# Patient Record
Sex: FEMALE | Race: WHITE | NOT HISPANIC OR LATINO | Employment: OTHER | URBAN - METROPOLITAN AREA
[De-identification: names, ages, dates, MRNs, and addresses within clinical notes are randomized per-mention and may not be internally consistent; named-entity substitution may affect disease eponyms.]

---

## 2019-08-27 ENCOUNTER — HOSPITAL ENCOUNTER (OUTPATIENT)
Facility: CLINIC | Age: 69
Discharge: HOME | End: 2019-08-27
Attending: COLON & RECTAL SURGERY | Admitting: COLON & RECTAL SURGERY
Payer: MEDICARE

## 2019-08-27 VITALS
HEART RATE: 74 BPM | HEIGHT: 66 IN | BODY MASS INDEX: 40.18 KG/M2 | DIASTOLIC BLOOD PRESSURE: 77 MMHG | RESPIRATION RATE: 26 BRPM | SYSTOLIC BLOOD PRESSURE: 128 MMHG | WEIGHT: 250 LBS | OXYGEN SATURATION: 95 %

## 2019-08-27 LAB — COLONOSCOPY: NORMAL

## 2019-08-27 PROCEDURE — 99153 MOD SED SAME PHYS/QHP EA: CPT | Performed by: COLON & RECTAL SURGERY

## 2019-08-27 PROCEDURE — 25000128 H RX IP 250 OP 636: Performed by: COLON & RECTAL SURGERY

## 2019-08-27 PROCEDURE — 45378 DIAGNOSTIC COLONOSCOPY: CPT | Performed by: COLON & RECTAL SURGERY

## 2019-08-27 PROCEDURE — G0105 COLORECTAL SCRN; HI RISK IND: HCPCS | Performed by: COLON & RECTAL SURGERY

## 2019-08-27 PROCEDURE — G0500 MOD SEDAT ENDO SERVICE >5YRS: HCPCS | Performed by: COLON & RECTAL SURGERY

## 2019-08-27 RX ORDER — FLUMAZENIL 0.1 MG/ML
0.2 INJECTION, SOLUTION INTRAVENOUS
Status: DISCONTINUED | OUTPATIENT
Start: 2019-08-27 | End: 2019-08-27 | Stop reason: HOSPADM

## 2019-08-27 RX ORDER — ANASTROZOLE 1 MG/1
1 TABLET ORAL EVERY MORNING
COMMUNITY
End: 2024-06-24

## 2019-08-27 RX ORDER — LOSARTAN POTASSIUM 50 MG/1
50 TABLET ORAL DAILY
COMMUNITY

## 2019-08-27 RX ORDER — ONDANSETRON 4 MG/1
4 TABLET, ORALLY DISINTEGRATING ORAL EVERY 6 HOURS PRN
Status: DISCONTINUED | OUTPATIENT
Start: 2019-08-27 | End: 2019-08-27 | Stop reason: HOSPADM

## 2019-08-27 RX ORDER — HYDROCHLOROTHIAZIDE 25 MG/1
12.5 TABLET ORAL DAILY
COMMUNITY

## 2019-08-27 RX ORDER — ONDANSETRON 2 MG/ML
4 INJECTION INTRAMUSCULAR; INTRAVENOUS
Status: DISCONTINUED | OUTPATIENT
Start: 2019-08-27 | End: 2019-08-27 | Stop reason: HOSPADM

## 2019-08-27 RX ORDER — FENTANYL CITRATE 50 UG/ML
INJECTION, SOLUTION INTRAMUSCULAR; INTRAVENOUS PRN
Status: DISCONTINUED | OUTPATIENT
Start: 2019-08-27 | End: 2019-08-27 | Stop reason: HOSPADM

## 2019-08-27 RX ORDER — ARIPIPRAZOLE 2 MG/1
2 TABLET ORAL DAILY
COMMUNITY
End: 2021-05-07

## 2019-08-27 RX ORDER — LIDOCAINE 40 MG/G
CREAM TOPICAL
Status: DISCONTINUED | OUTPATIENT
Start: 2019-08-27 | End: 2019-08-27 | Stop reason: HOSPADM

## 2019-08-27 RX ORDER — DULOXETIN HYDROCHLORIDE 30 MG/1
60 CAPSULE, DELAYED RELEASE ORAL DAILY
COMMUNITY

## 2019-08-27 RX ORDER — ONDANSETRON 2 MG/ML
4 INJECTION INTRAMUSCULAR; INTRAVENOUS EVERY 6 HOURS PRN
Status: DISCONTINUED | OUTPATIENT
Start: 2019-08-27 | End: 2019-08-27 | Stop reason: HOSPADM

## 2019-08-27 RX ORDER — NALOXONE HYDROCHLORIDE 0.4 MG/ML
.1-.4 INJECTION, SOLUTION INTRAMUSCULAR; INTRAVENOUS; SUBCUTANEOUS
Status: DISCONTINUED | OUTPATIENT
Start: 2019-08-27 | End: 2019-08-27 | Stop reason: HOSPADM

## 2019-08-27 ASSESSMENT — MIFFLIN-ST. JEOR: SCORE: 1680.74

## 2019-08-27 NOTE — BRIEF OP NOTE
[facility]    Brief Operative Note    Pre-operative diagnosis: HISTORY OF POLYPS; FAMILY HISTORY OF COLON CANCER  Post-operative diagnosis diverticulosis  Procedure: Procedure(s):  COLONOSCOPY  Surgeon: Surgeon(s) and Role:     * Paty Aguillon MD - Primary  Anesthesia: Conscious Sedation   Estimated blood loss: None  Drains: None  Specimens: * No specimens in log *  Findings:   See Provation procedure note in Epic    Complications: None.  Implants:  * No implants in log *

## 2019-08-27 NOTE — H&P
Pre-Endoscopy History and Physical     Mai Dutton MRN# [id number]   YOB: 1950 Age: 69 year old     Date of Procedure: 8/27/2019  Primary care provider: Cherry Grace  Type of Endoscopy: colonoscopy  Reason for Procedure: screening, family history   Type of Anesthesia Anticipated: moderate sedation    HPI:    Mai is a 69 year old female who will be undergoing the above procedure.  Patient reports multiple loose stools about once a week; in between she has normal formed stools. She denies  Bleeding.     A history and physical has been performed. The patient's medications and allergies have been reviewed. The risks and benefits of the procedure and the sedation options and risks were discussed with the patient.  All questions were answered and informed consent was obtained.      She denies a personal or family history of anesthesia complications or bleeding disorders.   Prior to Admission medications    Medication Sig Start Date End Date Taking? Authorizing Provider   anastrozole (ARIMIDEX) 1 MG tablet Take 1 mg by mouth daily   Yes Reported, Patient   ARIPiprazole (ABILIFY) 2 MG tablet Take 2 mg by mouth daily   Yes Reported, Patient   Cholecalciferol (VITAMIN D3) 2000 UNIT CAPS Take  by mouth.   Yes Reported, Patient   DULoxetine (CYMBALTA) 60 MG capsule Take 60 mg by mouth daily   Yes Reported, Patient   hydrochlorothiazide (HYDRODIURIL) 25 MG tablet Take 25 mg by mouth daily   Yes Reported, Patient   levothyroxine (SYNTHROID, LEVOTHROID) 112 MCG tablet Take 112 mcg by mouth daily.   Yes Reported, Patient   losartan (COZAAR) 50 MG tablet Take 50 mg by mouth daily   Yes Reported, Patient   melatonin 5 MG CAPS    Yes Reported, Patient   metFORMIN (GLUCOPHAGE) 500 MG tablet Take 1,000 mg by mouth 2 times daily (with meals)   Yes Reported, Patient   naproxen sodium (ALEVE) 220 MG tablet Take 220 mg by mouth 2 times daily (with meals).   Yes Reported, Patient   simvastatin (ZOCOR) 40 MG  tablet Take 40 mg by mouth At Bedtime.   Yes Reported, Patient   lidocaine-prilocaine (EMLA) cream Apply  topically as needed.    Reported, Patient   lorazepam (ATIVAN) 1 MG tablet Take 1 mg by mouth 4 times daily as needed.    Reported, Patient   mirtazapine (REMERON) 15 MG tablet Take 0.5 tablets by mouth At Bedtime. 11/4/11   Shekhar Schneider MD       Allergies   Allergen Reactions     Amoxicillin Nausea     Bees      Lisinopril Cough     Wasp Venom Protein      Other reaction(s): Edema        Current Facility-Administered Medications   Medication     lidocaine (LMX4) cream     lidocaine 1 % 0.1-1 mL     ondansetron (ZOFRAN) injection 4 mg     sodium chloride (PF) 0.9% PF flush 3 mL     sodium chloride (PF) 0.9% PF flush 3 mL       There is no problem list on file for this patient.       Past Medical History:   Diagnosis Date     Anxiety      Cancer (H)     right breast CA     Cancer (H)     uterine CA     Depressive disorder      Hyperlipidemia      Hypertension      Rheumatic fever     age 9     Skin cancer     melanoma     Thyroid disease         Past Surgical History:   Procedure Laterality Date     BREAST SURGERY Bilateral 2011    bilateral mastectomy for right breast CA stage 3     ENT SURGERY  age 9    tonsillectomy      EYE SURGERY Bilateral     lasik surgery     GYN SURGERY      vaginal hystectomy for uterine CA     ORTHOPEDIC SURGERY Left     left knee arthroscopy     SOFT TISSUE SURGERY      melanoma lesion, back --excision       Social History     Tobacco Use     Smoking status: Not on file   Substance Use Topics     Alcohol use: Not on file       Family History   Problem Relation Age of Onset     Coronary Artery Disease Mother      Diabetes Mother      Hypertension Mother      Hyperlipidemia Mother      Colon Cancer Father      Coronary Artery Disease Father      Hypertension Father      Hyperlipidemia Father        REVIEW OF SYSTEMS:     5 point ROS negative except as noted above in HPI, including  "Gen., Resp., CV, GI system review. She does report urinary incontinence.       PHYSICAL EXAM:   BP (!) 145/81   Pulse 78   Resp 21   Ht 1.676 m (5' 6\")   Wt 113.4 kg (250 lb)   SpO2 94%   BMI 40.35 kg/m   Estimated body mass index is 29.95 kg/m  as calculated from the following:    Height as of 11/2/11: 1.651 m (5' 5\").    Weight as of 11/2/11: 81.6 kg (180 lb).   GENERAL APPEARANCE: healthy  MENTAL STATUS: alert  AIRWAY EXAM: Mallampatti Class III (visualization of the soft palate and base of uvula)  RESP: lungs clear to auscultation - no rales, rhonchi or wheezes  CV: regular rates and rhythm      DIAGNOSTICS:    Not indicated      IMPRESSION   ASA Class 2 - Mild systemic disease        PLAN:       Colonoscopy with possible polypectomy, possible biopsy. The indications, procedure and risks were explained to the patient who agrees to proceed.       The above has been forwarded to the consulting provider.      Signed Electronically by: Paty Aguillon MD  August 27, 2019          "

## 2020-09-10 ENCOUNTER — TRANSFERRED RECORDS (OUTPATIENT)
Dept: HEALTH INFORMATION MANAGEMENT | Facility: CLINIC | Age: 70
End: 2020-09-10

## 2020-10-26 ENCOUNTER — HOSPITAL ENCOUNTER (OUTPATIENT)
Facility: CLINIC | Age: 70
End: 2020-10-26
Attending: ORTHOPAEDIC SURGERY | Admitting: ORTHOPAEDIC SURGERY
Payer: MEDICARE

## 2020-10-26 DIAGNOSIS — Z11.59 ENCOUNTER FOR SCREENING FOR OTHER VIRAL DISEASES: Primary | ICD-10-CM

## 2020-12-10 RX ORDER — ATORVASTATIN CALCIUM 40 MG/1
40 TABLET, FILM COATED ORAL AT BEDTIME
COMMUNITY

## 2020-12-10 RX ORDER — UBIDECARENONE 100 MG
100 CAPSULE ORAL DAILY
COMMUNITY
End: 2024-06-24

## 2021-04-22 DIAGNOSIS — Z11.59 ENCOUNTER FOR SCREENING FOR OTHER VIRAL DISEASES: ICD-10-CM

## 2021-05-07 RX ORDER — HYDROCODONE BITARTRATE AND ACETAMINOPHEN 5; 325 MG/1; MG/1
1 TABLET ORAL DAILY PRN
Status: ON HOLD | COMMUNITY
End: 2021-05-11

## 2021-05-07 NOTE — PROGRESS NOTES
PTA medications updated by Medication Scribe prior to surgery via phone call with patient        Comments:    Medication history sources: Patient, Surescripts and H&P  In the past week, patient estimated taking medication this percent of the time: Greater than 90%  Adherence assessment: N/A Not Observed    Significant changes made to the medication list:  None      Additional medication history information:   None    The information provided in this note is only as accurate as the sources available at the time of update(s)      Prior to Admission medications    Medication Sig Last Dose Taking? Auth Provider   anastrozole (ARIMIDEX) 1 MG tablet Take 1 mg by mouth every morning   at AM Yes Reported, Patient   atorvastatin (LIPITOR) 40 MG tablet Take 40 mg by mouth At Bedtime  at PM Yes Reported, Patient   Cholecalciferol (VITAMIN D3) 2000 UNIT CAPS Take 50 mcg by mouth daily   at AM Yes Reported, Patient   co-enzyme Q-10 100 MG CAPS capsule Take 100 mg by mouth daily  at AM Yes Reported, Patient   Cyanocobalamin (B-12) 1000 MCG TBCR Take 1,000 mcg by mouth daily  at AM Yes Reported, Patient   DULoxetine (CYMBALTA) 30 MG capsule Take 60 mg by mouth daily (2 X 30MG)  at AM Yes Reported, Patient   hydrochlorothiazide (HYDRODIURIL) 25 MG tablet Take 25 mg by mouth daily  at AM Yes Reported, Patient   HYDROcodone-acetaminophen (NORCO) 5-325 MG tablet Take 1 tablet by mouth daily as needed for severe pain  Yes Reported, Patient   levothyroxine (TIROSINT) 150 MCG capsule Take 150 mcg by mouth daily   at AM Yes Reported, Patient   losartan (COZAAR) 50 MG tablet Take 50 mg by mouth daily  at AM Yes Reported, Patient   melatonin 5 MG CAPS Take 5 mg by mouth every evening   at PM Yes Reported, Patient   metFORMIN (GLUCOPHAGE) 500 MG tablet Take 1,000 mg by mouth 2 times daily (with meals) (2 x 500mg)  Yes Reported, Patient   omeprazole (PRILOSEC) 20 MG DR capsule Take 20 mg by mouth daily as needed  at PRN Yes Reported, Patient

## 2021-05-10 ENCOUNTER — ANESTHESIA (OUTPATIENT)
Dept: SURGERY | Facility: CLINIC | Age: 71
End: 2021-05-10
Payer: MEDICARE

## 2021-05-10 ENCOUNTER — ANESTHESIA EVENT (OUTPATIENT)
Dept: SURGERY | Facility: CLINIC | Age: 71
End: 2021-05-10
Payer: MEDICARE

## 2021-05-10 ENCOUNTER — APPOINTMENT (OUTPATIENT)
Dept: GENERAL RADIOLOGY | Facility: CLINIC | Age: 71
End: 2021-05-10
Attending: SPECIALIST/TECHNOLOGIST
Payer: MEDICARE

## 2021-05-10 ENCOUNTER — APPOINTMENT (OUTPATIENT)
Dept: PHYSICAL THERAPY | Facility: CLINIC | Age: 71
End: 2021-05-10
Attending: ORTHOPAEDIC SURGERY
Payer: MEDICARE

## 2021-05-10 ENCOUNTER — HOSPITAL ENCOUNTER (OUTPATIENT)
Facility: CLINIC | Age: 71
Discharge: HOME HEALTH | End: 2021-05-11
Attending: ORTHOPAEDIC SURGERY | Admitting: ORTHOPAEDIC SURGERY
Payer: MEDICARE

## 2021-05-10 DIAGNOSIS — M17.11 OSTEOARTHRITIS OF RIGHT KNEE, UNSPECIFIED OSTEOARTHRITIS TYPE: ICD-10-CM

## 2021-05-10 DIAGNOSIS — Z96.651 STATUS POST TOTAL KNEE REPLACEMENT, RIGHT: Primary | ICD-10-CM

## 2021-05-10 LAB
CREAT SERPL-MCNC: 1.31 MG/DL (ref 0.52–1.04)
GFR SERPL CREATININE-BSD FRML MDRD: 41 ML/MIN/{1.73_M2}
GLUCOSE BLDC GLUCOMTR-MCNC: 165 MG/DL (ref 70–99)
GLUCOSE BLDC GLUCOMTR-MCNC: 251 MG/DL (ref 70–99)
LABORATORY COMMENT REPORT: NORMAL
SARS-COV-2 RNA RESP QL NAA+PROBE: NEGATIVE
SPECIMEN SOURCE: NORMAL

## 2021-05-10 PROCEDURE — 250N000009 HC RX 250: Performed by: NURSE ANESTHETIST, CERTIFIED REGISTERED

## 2021-05-10 PROCEDURE — 250N000011 HC RX IP 250 OP 636: Performed by: SPECIALIST/TECHNOLOGIST

## 2021-05-10 PROCEDURE — 250N000011 HC RX IP 250 OP 636: Performed by: ANESTHESIOLOGY

## 2021-05-10 PROCEDURE — 82565 ASSAY OF CREATININE: CPT | Performed by: ORTHOPAEDIC SURGERY

## 2021-05-10 PROCEDURE — 250N000025 HC SEVOFLURANE, PER MIN: Performed by: ORTHOPAEDIC SURGERY

## 2021-05-10 PROCEDURE — 360N000077 HC SURGERY LEVEL 4, PER MIN: Performed by: ORTHOPAEDIC SURGERY

## 2021-05-10 PROCEDURE — 258N000003 HC RX IP 258 OP 636: Performed by: NURSE ANESTHETIST, CERTIFIED REGISTERED

## 2021-05-10 PROCEDURE — 999N000141 HC STATISTIC PRE-PROCEDURE NURSING ASSESSMENT: Performed by: ORTHOPAEDIC SURGERY

## 2021-05-10 PROCEDURE — 250N000013 HC RX MED GY IP 250 OP 250 PS 637: Performed by: SPECIALIST/TECHNOLOGIST

## 2021-05-10 PROCEDURE — 258N000001 HC RX 258: Performed by: ORTHOPAEDIC SURGERY

## 2021-05-10 PROCEDURE — 82962 GLUCOSE BLOOD TEST: CPT | Mod: 91

## 2021-05-10 PROCEDURE — 278N000051 HC OR IMPLANT GENERAL: Performed by: ORTHOPAEDIC SURGERY

## 2021-05-10 PROCEDURE — 272N000001 HC OR GENERAL SUPPLY STERILE: Performed by: ORTHOPAEDIC SURGERY

## 2021-05-10 PROCEDURE — 97530 THERAPEUTIC ACTIVITIES: CPT | Mod: GP | Performed by: PHYSICAL THERAPIST

## 2021-05-10 PROCEDURE — 258N000003 HC RX IP 258 OP 636: Performed by: SPECIALIST/TECHNOLOGIST

## 2021-05-10 PROCEDURE — 250N000013 HC RX MED GY IP 250 OP 250 PS 637: Performed by: INTERNAL MEDICINE

## 2021-05-10 PROCEDURE — 258N000003 HC RX IP 258 OP 636: Performed by: ANESTHESIOLOGY

## 2021-05-10 PROCEDURE — 250N000011 HC RX IP 250 OP 636: Performed by: NURSE ANESTHETIST, CERTIFIED REGISTERED

## 2021-05-10 PROCEDURE — C1776 JOINT DEVICE (IMPLANTABLE): HCPCS | Performed by: ORTHOPAEDIC SURGERY

## 2021-05-10 PROCEDURE — 999N000063 XR KNEE PORT RIGHT 1/2 VIEWS: Mod: RT

## 2021-05-10 PROCEDURE — 96372 THER/PROPH/DIAG INJ SC/IM: CPT | Performed by: PHYSICIAN ASSISTANT

## 2021-05-10 PROCEDURE — 370N000017 HC ANESTHESIA TECHNICAL FEE, PER MIN: Performed by: ORTHOPAEDIC SURGERY

## 2021-05-10 PROCEDURE — 250N000009 HC RX 250: Performed by: ANESTHESIOLOGY

## 2021-05-10 PROCEDURE — 36415 COLL VENOUS BLD VENIPUNCTURE: CPT | Performed by: ORTHOPAEDIC SURGERY

## 2021-05-10 PROCEDURE — 250N000009 HC RX 250: Performed by: ORTHOPAEDIC SURGERY

## 2021-05-10 PROCEDURE — 97161 PT EVAL LOW COMPLEX 20 MIN: CPT | Mod: GP | Performed by: PHYSICAL THERAPIST

## 2021-05-10 PROCEDURE — 250N000012 HC RX MED GY IP 250 OP 636 PS 637: Performed by: PHYSICIAN ASSISTANT

## 2021-05-10 PROCEDURE — 710N000009 HC RECOVERY PHASE 1, LEVEL 1, PER MIN: Performed by: ORTHOPAEDIC SURGERY

## 2021-05-10 PROCEDURE — 250N000013 HC RX MED GY IP 250 OP 250 PS 637: Performed by: PHYSICIAN ASSISTANT

## 2021-05-10 PROCEDURE — 87635 SARS-COV-2 COVID-19 AMP PRB: CPT | Performed by: ORTHOPAEDIC SURGERY

## 2021-05-10 PROCEDURE — 99204 OFFICE O/P NEW MOD 45 MIN: CPT | Performed by: HOSPITALIST

## 2021-05-10 PROCEDURE — 99207 PR APP CREDIT; MD BILLING SHARED VISIT: CPT | Performed by: PHYSICIAN ASSISTANT

## 2021-05-10 PROCEDURE — 97116 GAIT TRAINING THERAPY: CPT | Mod: GP | Performed by: PHYSICAL THERAPIST

## 2021-05-10 PROCEDURE — 99207 PR CDG-CODE CATEGORY CHANGED: CPT | Performed by: HOSPITALIST

## 2021-05-10 DEVICE — CRUCIATE RETAINING FEMORAL
Type: IMPLANTABLE DEVICE | Site: KNEE | Status: FUNCTIONAL
Brand: TRIATHLON

## 2021-05-10 DEVICE — UNIVERSAL TIBIAL BASEPLATE
Type: IMPLANTABLE DEVICE | Site: KNEE | Status: FUNCTIONAL
Brand: TRIATHLON

## 2021-05-10 DEVICE — BONE CEMENT SIMPLEX FULL DOSE 6191-1-001: Type: IMPLANTABLE DEVICE | Site: KNEE | Status: FUNCTIONAL

## 2021-05-10 DEVICE — PATELLA
Type: IMPLANTABLE DEVICE | Site: KNEE | Status: FUNCTIONAL
Brand: TRIATHLON

## 2021-05-10 RX ORDER — HYDROMORPHONE HYDROCHLORIDE 1 MG/ML
.3-.5 INJECTION, SOLUTION INTRAMUSCULAR; INTRAVENOUS; SUBCUTANEOUS EVERY 5 MIN PRN
Status: DISCONTINUED | OUTPATIENT
Start: 2021-05-10 | End: 2021-05-10 | Stop reason: HOSPADM

## 2021-05-10 RX ORDER — SODIUM CHLORIDE, SODIUM LACTATE, POTASSIUM CHLORIDE, CALCIUM CHLORIDE 600; 310; 30; 20 MG/100ML; MG/100ML; MG/100ML; MG/100ML
INJECTION, SOLUTION INTRAVENOUS CONTINUOUS
Status: DISCONTINUED | OUTPATIENT
Start: 2021-05-10 | End: 2021-05-10 | Stop reason: HOSPADM

## 2021-05-10 RX ORDER — OXYCODONE HYDROCHLORIDE 5 MG/1
5 TABLET ORAL EVERY 4 HOURS PRN
Status: DISCONTINUED | OUTPATIENT
Start: 2021-05-10 | End: 2021-05-11 | Stop reason: HOSPADM

## 2021-05-10 RX ORDER — KETOROLAC TROMETHAMINE 15 MG/ML
15 INJECTION, SOLUTION INTRAMUSCULAR; INTRAVENOUS EVERY 6 HOURS
Status: DISCONTINUED | OUTPATIENT
Start: 2021-05-10 | End: 2021-05-10

## 2021-05-10 RX ORDER — DEXAMETHASONE SODIUM PHOSPHATE 4 MG/ML
10 INJECTION, SOLUTION INTRA-ARTICULAR; INTRALESIONAL; INTRAMUSCULAR; INTRAVENOUS; SOFT TISSUE ONCE
Status: COMPLETED | OUTPATIENT
Start: 2021-05-10 | End: 2021-05-10

## 2021-05-10 RX ORDER — NALOXONE HYDROCHLORIDE 0.4 MG/ML
0.2 INJECTION, SOLUTION INTRAMUSCULAR; INTRAVENOUS; SUBCUTANEOUS
Status: DISCONTINUED | OUTPATIENT
Start: 2021-05-10 | End: 2021-05-11 | Stop reason: HOSPADM

## 2021-05-10 RX ORDER — NALOXONE HYDROCHLORIDE 0.4 MG/ML
0.4 INJECTION, SOLUTION INTRAMUSCULAR; INTRAVENOUS; SUBCUTANEOUS
Status: DISCONTINUED | OUTPATIENT
Start: 2021-05-10 | End: 2021-05-11 | Stop reason: HOSPADM

## 2021-05-10 RX ORDER — ATORVASTATIN CALCIUM 40 MG/1
40 TABLET, FILM COATED ORAL AT BEDTIME
Status: DISCONTINUED | OUTPATIENT
Start: 2021-05-10 | End: 2021-05-11 | Stop reason: HOSPADM

## 2021-05-10 RX ORDER — ACETAMINOPHEN 325 MG/1
975 TABLET ORAL EVERY 8 HOURS
Status: DISCONTINUED | OUTPATIENT
Start: 2021-05-10 | End: 2021-05-11 | Stop reason: HOSPADM

## 2021-05-10 RX ORDER — DEXTROSE MONOHYDRATE 25 G/50ML
25-50 INJECTION, SOLUTION INTRAVENOUS
Status: DISCONTINUED | OUTPATIENT
Start: 2021-05-10 | End: 2021-05-11 | Stop reason: HOSPADM

## 2021-05-10 RX ORDER — DIPHENHYDRAMINE HCL 12.5MG/5ML
12.5 LIQUID (ML) ORAL EVERY 6 HOURS PRN
Status: DISCONTINUED | OUTPATIENT
Start: 2021-05-10 | End: 2021-05-11 | Stop reason: HOSPADM

## 2021-05-10 RX ORDER — BISACODYL 10 MG
10 SUPPOSITORY, RECTAL RECTAL DAILY PRN
Status: DISCONTINUED | OUTPATIENT
Start: 2021-05-10 | End: 2021-05-11 | Stop reason: HOSPADM

## 2021-05-10 RX ORDER — POLYETHYLENE GLYCOL 3350 17 G/17G
17 POWDER, FOR SOLUTION ORAL DAILY
Status: DISCONTINUED | OUTPATIENT
Start: 2021-05-11 | End: 2021-05-11 | Stop reason: HOSPADM

## 2021-05-10 RX ORDER — OXYCODONE HYDROCHLORIDE 5 MG/1
10 TABLET ORAL EVERY 4 HOURS PRN
Status: DISCONTINUED | OUTPATIENT
Start: 2021-05-10 | End: 2021-05-11 | Stop reason: HOSPADM

## 2021-05-10 RX ORDER — NALOXONE HYDROCHLORIDE 0.4 MG/ML
0.2 INJECTION, SOLUTION INTRAMUSCULAR; INTRAVENOUS; SUBCUTANEOUS
Status: DISCONTINUED | OUTPATIENT
Start: 2021-05-10 | End: 2021-05-10

## 2021-05-10 RX ORDER — DULOXETIN HYDROCHLORIDE 30 MG/1
60 CAPSULE, DELAYED RELEASE ORAL DAILY
Status: DISCONTINUED | OUTPATIENT
Start: 2021-05-11 | End: 2021-05-11 | Stop reason: HOSPADM

## 2021-05-10 RX ORDER — FENTANYL CITRATE 50 UG/ML
INJECTION, SOLUTION INTRAMUSCULAR; INTRAVENOUS PRN
Status: DISCONTINUED | OUTPATIENT
Start: 2021-05-10 | End: 2021-05-10

## 2021-05-10 RX ORDER — PROPOFOL 10 MG/ML
INJECTION, EMULSION INTRAVENOUS PRN
Status: DISCONTINUED | OUTPATIENT
Start: 2021-05-10 | End: 2021-05-10

## 2021-05-10 RX ORDER — NALOXONE HYDROCHLORIDE 0.4 MG/ML
0.4 INJECTION, SOLUTION INTRAMUSCULAR; INTRAVENOUS; SUBCUTANEOUS
Status: DISCONTINUED | OUTPATIENT
Start: 2021-05-10 | End: 2021-05-10

## 2021-05-10 RX ORDER — LEVOTHYROXINE SODIUM 13 UG/1
150 CAPSULE ORAL
Status: DISCONTINUED | OUTPATIENT
Start: 2021-05-11 | End: 2021-05-11 | Stop reason: HOSPADM

## 2021-05-10 RX ORDER — SODIUM CHLORIDE, SODIUM LACTATE, POTASSIUM CHLORIDE, CALCIUM CHLORIDE 600; 310; 30; 20 MG/100ML; MG/100ML; MG/100ML; MG/100ML
INJECTION, SOLUTION INTRAVENOUS CONTINUOUS
Status: DISCONTINUED | OUTPATIENT
Start: 2021-05-10 | End: 2021-05-11 | Stop reason: HOSPADM

## 2021-05-10 RX ORDER — TRANEXAMIC ACID 650 MG/1
1950 TABLET ORAL ONCE
Status: COMPLETED | OUTPATIENT
Start: 2021-05-10 | End: 2021-05-10

## 2021-05-10 RX ORDER — ONDANSETRON 4 MG/1
4 TABLET, ORALLY DISINTEGRATING ORAL EVERY 30 MIN PRN
Status: DISCONTINUED | OUTPATIENT
Start: 2021-05-10 | End: 2021-05-10 | Stop reason: HOSPADM

## 2021-05-10 RX ORDER — HYDROMORPHONE HYDROCHLORIDE 1 MG/ML
0.4 INJECTION, SOLUTION INTRAMUSCULAR; INTRAVENOUS; SUBCUTANEOUS
Status: DISCONTINUED | OUTPATIENT
Start: 2021-05-10 | End: 2021-05-11 | Stop reason: HOSPADM

## 2021-05-10 RX ORDER — ONDANSETRON 4 MG/1
4 TABLET, ORALLY DISINTEGRATING ORAL EVERY 6 HOURS PRN
Status: DISCONTINUED | OUTPATIENT
Start: 2021-05-10 | End: 2021-05-11 | Stop reason: HOSPADM

## 2021-05-10 RX ORDER — HYDROMORPHONE HCL IN WATER/PF 6 MG/30 ML
0.2 PATIENT CONTROLLED ANALGESIA SYRINGE INTRAVENOUS
Status: DISCONTINUED | OUTPATIENT
Start: 2021-05-10 | End: 2021-05-11 | Stop reason: HOSPADM

## 2021-05-10 RX ORDER — ASPIRIN 81 MG/1
81 TABLET ORAL 2 TIMES DAILY WITH MEALS
Status: DISCONTINUED | OUTPATIENT
Start: 2021-05-10 | End: 2021-05-11 | Stop reason: HOSPADM

## 2021-05-10 RX ORDER — CEFAZOLIN SODIUM 2 G/100ML
2 INJECTION, SOLUTION INTRAVENOUS SEE ADMIN INSTRUCTIONS
Status: DISCONTINUED | OUTPATIENT
Start: 2021-05-10 | End: 2021-05-10 | Stop reason: HOSPADM

## 2021-05-10 RX ORDER — AMOXICILLIN 250 MG
1 CAPSULE ORAL 2 TIMES DAILY
Status: DISCONTINUED | OUTPATIENT
Start: 2021-05-10 | End: 2021-05-11 | Stop reason: HOSPADM

## 2021-05-10 RX ORDER — ACETAMINOPHEN 325 MG/1
650 TABLET ORAL EVERY 4 HOURS PRN
Qty: 100 TABLET | Refills: 0 | Status: SHIPPED | OUTPATIENT
Start: 2021-05-10

## 2021-05-10 RX ORDER — PROPOFOL 10 MG/ML
INJECTION, EMULSION INTRAVENOUS CONTINUOUS PRN
Status: DISCONTINUED | OUTPATIENT
Start: 2021-05-10 | End: 2021-05-10

## 2021-05-10 RX ORDER — ACETAMINOPHEN 325 MG/1
650 TABLET ORAL EVERY 4 HOURS PRN
Status: DISCONTINUED | OUTPATIENT
Start: 2021-05-13 | End: 2021-05-11 | Stop reason: HOSPADM

## 2021-05-10 RX ORDER — PROCHLORPERAZINE MALEATE 5 MG
5 TABLET ORAL EVERY 6 HOURS PRN
Status: DISCONTINUED | OUTPATIENT
Start: 2021-05-10 | End: 2021-05-11 | Stop reason: HOSPADM

## 2021-05-10 RX ORDER — KETOROLAC TROMETHAMINE 30 MG/ML
15 INJECTION, SOLUTION INTRAMUSCULAR; INTRAVENOUS EVERY 6 HOURS PRN
Status: DISCONTINUED | OUTPATIENT
Start: 2021-05-10 | End: 2021-05-10 | Stop reason: HOSPADM

## 2021-05-10 RX ORDER — ACETAMINOPHEN 325 MG/1
975 TABLET ORAL ONCE
Status: COMPLETED | OUTPATIENT
Start: 2021-05-10 | End: 2021-05-10

## 2021-05-10 RX ORDER — ANASTROZOLE 1 MG/1
1 TABLET ORAL EVERY MORNING
Status: DISCONTINUED | OUTPATIENT
Start: 2021-05-11 | End: 2021-05-11 | Stop reason: HOSPADM

## 2021-05-10 RX ORDER — CEFAZOLIN SODIUM 2 G/100ML
2 INJECTION, SOLUTION INTRAVENOUS EVERY 8 HOURS
Status: COMPLETED | OUTPATIENT
Start: 2021-05-10 | End: 2021-05-10

## 2021-05-10 RX ORDER — LIDOCAINE HYDROCHLORIDE 20 MG/ML
INJECTION, SOLUTION INFILTRATION; PERINEURAL PRN
Status: DISCONTINUED | OUTPATIENT
Start: 2021-05-10 | End: 2021-05-10

## 2021-05-10 RX ORDER — NICOTINE POLACRILEX 4 MG
15-30 LOZENGE BUCCAL
Status: DISCONTINUED | OUTPATIENT
Start: 2021-05-10 | End: 2021-05-11 | Stop reason: HOSPADM

## 2021-05-10 RX ORDER — ONDANSETRON 2 MG/ML
4 INJECTION INTRAMUSCULAR; INTRAVENOUS EVERY 6 HOURS PRN
Status: DISCONTINUED | OUTPATIENT
Start: 2021-05-10 | End: 2021-05-11 | Stop reason: HOSPADM

## 2021-05-10 RX ORDER — FAMOTIDINE 20 MG/1
20 TABLET, FILM COATED ORAL 2 TIMES DAILY
Status: DISCONTINUED | OUTPATIENT
Start: 2021-05-10 | End: 2021-05-11 | Stop reason: HOSPADM

## 2021-05-10 RX ORDER — ONDANSETRON 2 MG/ML
4 INJECTION INTRAMUSCULAR; INTRAVENOUS EVERY 30 MIN PRN
Status: DISCONTINUED | OUTPATIENT
Start: 2021-05-10 | End: 2021-05-10 | Stop reason: HOSPADM

## 2021-05-10 RX ORDER — ASPIRIN 81 MG/1
81 TABLET ORAL 2 TIMES DAILY
Qty: 60 TABLET | Refills: 0 | Status: SHIPPED | OUTPATIENT
Start: 2021-05-10 | End: 2024-06-24

## 2021-05-10 RX ORDER — FENTANYL CITRATE 50 UG/ML
25-50 INJECTION, SOLUTION INTRAMUSCULAR; INTRAVENOUS
Status: DISCONTINUED | OUTPATIENT
Start: 2021-05-10 | End: 2021-05-10 | Stop reason: HOSPADM

## 2021-05-10 RX ORDER — ONDANSETRON 2 MG/ML
INJECTION INTRAMUSCULAR; INTRAVENOUS PRN
Status: DISCONTINUED | OUTPATIENT
Start: 2021-05-10 | End: 2021-05-10

## 2021-05-10 RX ORDER — LIDOCAINE 40 MG/G
CREAM TOPICAL
Status: DISCONTINUED | OUTPATIENT
Start: 2021-05-10 | End: 2021-05-11 | Stop reason: HOSPADM

## 2021-05-10 RX ORDER — DOCUSATE SODIUM 100 MG/1
100 CAPSULE, LIQUID FILLED ORAL 2 TIMES DAILY
Status: DISCONTINUED | OUTPATIENT
Start: 2021-05-10 | End: 2021-05-11 | Stop reason: HOSPADM

## 2021-05-10 RX ORDER — OXYCODONE HYDROCHLORIDE 5 MG/1
5-10 TABLET ORAL EVERY 4 HOURS PRN
Qty: 30 TABLET | Refills: 0 | Status: SHIPPED | OUTPATIENT
Start: 2021-05-10 | End: 2024-06-24

## 2021-05-10 RX ORDER — CEFAZOLIN SODIUM 2 G/100ML
2 INJECTION, SOLUTION INTRAVENOUS
Status: COMPLETED | OUTPATIENT
Start: 2021-05-10 | End: 2021-05-10

## 2021-05-10 RX ADMIN — PHENYLEPHRINE HYDROCHLORIDE 100 MCG: 10 INJECTION INTRAVENOUS at 08:58

## 2021-05-10 RX ADMIN — HYDROMORPHONE HYDROCHLORIDE 0.5 MG: 1 INJECTION, SOLUTION INTRAMUSCULAR; INTRAVENOUS; SUBCUTANEOUS at 08:27

## 2021-05-10 RX ADMIN — PHENYLEPHRINE HYDROCHLORIDE 150 MCG: 10 INJECTION INTRAVENOUS at 09:04

## 2021-05-10 RX ADMIN — DEXAMETHASONE SODIUM PHOSPHATE 10 MG: 4 INJECTION, SOLUTION INTRA-ARTICULAR; INTRALESIONAL; INTRAMUSCULAR; INTRAVENOUS; SOFT TISSUE at 07:56

## 2021-05-10 RX ADMIN — SODIUM CHLORIDE, POTASSIUM CHLORIDE, SODIUM LACTATE AND CALCIUM CHLORIDE: 600; 310; 30; 20 INJECTION, SOLUTION INTRAVENOUS at 06:41

## 2021-05-10 RX ADMIN — FENTANYL CITRATE 50 MCG: 50 INJECTION, SOLUTION INTRAMUSCULAR; INTRAVENOUS at 07:39

## 2021-05-10 RX ADMIN — SENNOSIDES AND DOCUSATE SODIUM 1 TABLET: 8.6; 5 TABLET ORAL at 21:48

## 2021-05-10 RX ADMIN — PHENYLEPHRINE HYDROCHLORIDE 150 MCG: 10 INJECTION INTRAVENOUS at 09:16

## 2021-05-10 RX ADMIN — PHENYLEPHRINE HYDROCHLORIDE 100 MCG: 10 INJECTION INTRAVENOUS at 07:58

## 2021-05-10 RX ADMIN — PHENYLEPHRINE HYDROCHLORIDE 150 MCG: 10 INJECTION INTRAVENOUS at 09:24

## 2021-05-10 RX ADMIN — ATORVASTATIN CALCIUM 40 MG: 40 TABLET, FILM COATED ORAL at 21:49

## 2021-05-10 RX ADMIN — MIDAZOLAM 1 MG: 1 INJECTION INTRAMUSCULAR; INTRAVENOUS at 07:47

## 2021-05-10 RX ADMIN — ONDANSETRON 4 MG: 2 INJECTION INTRAMUSCULAR; INTRAVENOUS at 09:12

## 2021-05-10 RX ADMIN — KETOROLAC TROMETHAMINE 15 MG: 30 INJECTION, SOLUTION INTRAMUSCULAR at 11:09

## 2021-05-10 RX ADMIN — ACETAMINOPHEN 975 MG: 325 TABLET, FILM COATED ORAL at 21:48

## 2021-05-10 RX ADMIN — PROPOFOL 200 MG: 10 INJECTION, EMULSION INTRAVENOUS at 07:50

## 2021-05-10 RX ADMIN — PHENYLEPHRINE HYDROCHLORIDE 100 MCG: 10 INJECTION INTRAVENOUS at 09:36

## 2021-05-10 RX ADMIN — PHENYLEPHRINE HYDROCHLORIDE 100 MCG: 10 INJECTION INTRAVENOUS at 08:04

## 2021-05-10 RX ADMIN — CEFAZOLIN SODIUM 2 G: 2 INJECTION, SOLUTION INTRAVENOUS at 16:45

## 2021-05-10 RX ADMIN — LIDOCAINE HYDROCHLORIDE 0.5 ML: 10 INJECTION, SOLUTION EPIDURAL; INFILTRATION; INTRACAUDAL; PERINEURAL at 06:32

## 2021-05-10 RX ADMIN — TRANEXAMIC ACID 1950 MG: 650 TABLET ORAL at 06:20

## 2021-05-10 RX ADMIN — ACETAMINOPHEN 975 MG: 325 TABLET, FILM COATED ORAL at 14:40

## 2021-05-10 RX ADMIN — DEXMEDETOMIDINE HYDROCHLORIDE 8 MCG: 100 INJECTION, SOLUTION INTRAVENOUS at 07:46

## 2021-05-10 RX ADMIN — FAMOTIDINE 20 MG: 20 TABLET ORAL at 21:49

## 2021-05-10 RX ADMIN — CEFAZOLIN SODIUM 2 G: 2 INJECTION, SOLUTION INTRAVENOUS at 23:26

## 2021-05-10 RX ADMIN — PHENYLEPHRINE HYDROCHLORIDE 100 MCG: 10 INJECTION INTRAVENOUS at 08:51

## 2021-05-10 RX ADMIN — OXYCODONE HYDROCHLORIDE 5 MG: 5 TABLET ORAL at 17:11

## 2021-05-10 RX ADMIN — ACETAMINOPHEN 975 MG: 325 TABLET, FILM COATED ORAL at 06:20

## 2021-05-10 RX ADMIN — PHENYLEPHRINE HYDROCHLORIDE 50 MCG: 10 INJECTION INTRAVENOUS at 09:30

## 2021-05-10 RX ADMIN — FENTANYL CITRATE 50 MCG: 0.05 INJECTION, SOLUTION INTRAMUSCULAR; INTRAVENOUS at 10:29

## 2021-05-10 RX ADMIN — MELATONIN 5 MG TABLET 5 MG: at 21:48

## 2021-05-10 RX ADMIN — PROPOFOL 20 MCG/KG/MIN: 10 INJECTION, EMULSION INTRAVENOUS at 08:02

## 2021-05-10 RX ADMIN — INSULIN ASPART 3 UNITS: 100 INJECTION, SOLUTION INTRAVENOUS; SUBCUTANEOUS at 18:19

## 2021-05-10 RX ADMIN — ASPIRIN 81 MG: 81 TABLET, DELAYED RELEASE ORAL at 17:11

## 2021-05-10 RX ADMIN — CEFAZOLIN SODIUM 2 G: 2 INJECTION, SOLUTION INTRAVENOUS at 07:53

## 2021-05-10 RX ADMIN — DOCUSATE SODIUM 100 MG: 100 CAPSULE, LIQUID FILLED ORAL at 21:48

## 2021-05-10 RX ADMIN — PHENYLEPHRINE HYDROCHLORIDE 100 MCG: 10 INJECTION INTRAVENOUS at 08:40

## 2021-05-10 RX ADMIN — SODIUM CHLORIDE, POTASSIUM CHLORIDE, SODIUM LACTATE AND CALCIUM CHLORIDE: 600; 310; 30; 20 INJECTION, SOLUTION INTRAVENOUS at 08:40

## 2021-05-10 RX ADMIN — MIDAZOLAM HYDROCHLORIDE 2 MG: 1 INJECTION, SOLUTION INTRAMUSCULAR; INTRAVENOUS at 07:06

## 2021-05-10 RX ADMIN — SODIUM CHLORIDE, POTASSIUM CHLORIDE, SODIUM LACTATE AND CALCIUM CHLORIDE: 600; 310; 30; 20 INJECTION, SOLUTION INTRAVENOUS at 12:27

## 2021-05-10 RX ADMIN — FENTANYL CITRATE 25 MCG: 50 INJECTION, SOLUTION INTRAMUSCULAR; INTRAVENOUS at 07:48

## 2021-05-10 RX ADMIN — FENTANYL CITRATE 25 MCG: 50 INJECTION, SOLUTION INTRAMUSCULAR; INTRAVENOUS at 07:43

## 2021-05-10 RX ADMIN — PHENYLEPHRINE HYDROCHLORIDE 150 MCG: 10 INJECTION INTRAVENOUS at 09:10

## 2021-05-10 RX ADMIN — PHENYLEPHRINE HYDROCHLORIDE 150 MCG: 10 INJECTION INTRAVENOUS at 08:07

## 2021-05-10 RX ADMIN — HYDROMORPHONE HYDROCHLORIDE 0.5 MG: 1 INJECTION, SOLUTION INTRAMUSCULAR; INTRAVENOUS; SUBCUTANEOUS at 08:19

## 2021-05-10 RX ADMIN — LIDOCAINE HYDROCHLORIDE 100 MG: 20 INJECTION, SOLUTION INFILTRATION; PERINEURAL at 07:50

## 2021-05-10 RX ADMIN — OXYCODONE HYDROCHLORIDE 5 MG: 5 TABLET ORAL at 12:54

## 2021-05-10 RX ADMIN — OXYCODONE HYDROCHLORIDE 5 MG: 5 TABLET ORAL at 21:48

## 2021-05-10 RX ADMIN — PHENYLEPHRINE HYDROCHLORIDE 150 MCG: 10 INJECTION INTRAVENOUS at 09:21

## 2021-05-10 RX ADMIN — FAMOTIDINE 20 MG: 20 TABLET ORAL at 12:54

## 2021-05-10 RX ADMIN — DEXMEDETOMIDINE HYDROCHLORIDE 12 MCG: 100 INJECTION, SOLUTION INTRAVENOUS at 07:42

## 2021-05-10 RX ADMIN — HYDROMORPHONE HYDROCHLORIDE 0.5 MG: 1 INJECTION, SOLUTION INTRAMUSCULAR; INTRAVENOUS; SUBCUTANEOUS at 10:37

## 2021-05-10 RX ADMIN — PHENYLEPHRINE HYDROCHLORIDE 150 MCG: 10 INJECTION INTRAVENOUS at 08:12

## 2021-05-10 ASSESSMENT — MIFFLIN-ST. JEOR: SCORE: 1682.55

## 2021-05-10 ASSESSMENT — LIFESTYLE VARIABLES: TOBACCO_USE: 0

## 2021-05-10 NOTE — CONSULTS
[facility]  Consult Note - Hospitalist Service     Date of Admission:  5/10/2021  Consult Requested by: Heidi Doshi PA-C   Reason for Consult: Post-operative medical co-management     Assessment & Plan   Mai Dutton is a 70 year old female with PMHx of hypertension, GERD, NIDDM, MARLYN, lymphedema, and breast cancer admitted on 5/10/2021 and underwent a right total knee arthroplasty for right knee osteoarthritis. Hospitalist service was consulted for medical co-management.     S/P right total knee arthroplasty: Surgery for right knee osteoarthritis. Surgery per Dr. Guillermo. General anesthesia + adductor canal block. EBL 25 ccs.   -- Defer routine post-operative cares, IVF, DVT prophylaxis and pain control to primary service   -- Perioperative Ancef   - DVT prophy with ASA 81 mg BID   - Analgesic regimen with Tylenol 975 mg TID, oxycodone 5-10 mg q4 hrs PRN, IV dilaudid 0.2-0.4 mg q2 hrs PRN   -- Encourage pulmonary toilet; incentive spirometer at bedside   -- Bowel regimen in place while on narcotics   -- PT and OT in the AM   -- CBC and BMP in AM     Hypertension  Hyperlipidemia: Continue PTA Atorvastatin. Hold hydrochlorothiazide and Losartan until repeat renal fxn in the AM.     CKD III: Baseline creatnine 1.1-1.3.   - Repeat BMP in the AM     T2DM, non-insulin dependent: A1C 6.9 on 4/20/21.   - Hold metformin   - Medium sliding scale insulin ordered   - BS per protocol   - Monitor for hypoglycemia    MARLYN: CPAP with home settings ordered    Alcohol use: Drinks a couple glasses of wine each night. No hx of withdrawal reported.   - Monitor for hx of withdrawal.     Hx of breast cancer: Follows with Dr. Cliff Contreras. Hx of bilat mastectomy in 2011 with neoadjuvant chemo for positive noted.   - Continue PTA Arimidex     Endometrial cancer: S/P BILL/BSO. Follows with Dr. Neumann.     Urge incontinence   Recent UTI: At pre-op physical, pt was noted to have asymptomatic pyuria with culture  growing E. Coli. Treated with Keflex. .     The patient's care was discussed with the Attending Physician, Dr. Ramires, Bedside Nurse and Patient.    Mally Rincon PA-C  [facility]  Contact information available via [facility] Paging/Directory  ______________________________________________________________________    Chief Complaint   S/P R TKA    History is obtained from the patient    History of Present Illness   Mai Dutton is a 70 year old female with PMHx of hypertension, GERD, NIDDM, MARLYN, lymphedema, and breast cancer admitted on 5/10/2021 and underwent a right total knee arthroplasty for right knee osteoarthritis. Hospitalist service was consulted for medical co-management.     Surgery for right knee osteoarthritis. Surgery per Dr. Guillermo. General anesthesia + adductor canal block. EBL 25 ccs. Seen post-op. Tired following surgery.  at bedside. Pain well managed. No acute concerns.     Review of Systems   The 10 point Review of Systems is negative other than noted in the HPI.    Past Medical History    I have reviewed this patient's medical history and updated it with pertinent information if needed.   Past Medical History:   Diagnosis Date     Anxiety      Arthritis      Cancer (H)     right breast CA     Cancer (H)     uterine CA     Depressive disorder      Diabetes (H)      Hyperlipidemia      Hypertension      Hypothyroidism      Obesity      Rheumatic fever     age 9     Skin cancer     melanoma     Sleep apnea     no Cpap     Spinal stenosis      Thyroid disease      Urge incontinence      Past Surgical History   I have reviewed this patient's surgical history and updated it with pertinent information if needed.  Past Surgical History:   Procedure Laterality Date     BIOPSY      breast     BREAST SURGERY Bilateral 2011    bilateral mastectomy for right breast CA stage 3     COLONOSCOPY N/A 8/27/2019    Procedure: COLONOSCOPY;  Surgeon: Paty Aguillon  MD Deana;  Location:  GI     ENT SURGERY  age 9    tonsillectomy      EYE SURGERY Bilateral     lasik surgery     GYN SURGERY      vaginal hystectomy for uterine CA     ORTHOPEDIC SURGERY Left     left knee arthroscopy     SOFT TISSUE SURGERY      melanoma lesion, back --excision       Social History   I have reviewed this patient's social history and updated it with pertinent information if needed.  Social History     Tobacco Use     Smoking status: Never Smoker     Smokeless tobacco: Never Used   Substance Use Topics     Alcohol use: Yes     Comment: 1 beer per day     Drug use: Never     Retired OR nurse. Drinks 2 alcoholic beverages daily.     Family History   I have reviewed this patient's family history and updated it with pertinent information if needed.  Family History   Problem Relation Age of Onset     Coronary Artery Disease Mother      Diabetes Mother      Hypertension Mother      Hyperlipidemia Mother      Colon Cancer Father      Coronary Artery Disease Father      Hypertension Father      Hyperlipidemia Father        Medications   Medications Prior to Admission   Medication Sig Dispense Refill Last Dose     anastrozole (ARIMIDEX) 1 MG tablet Take 1 mg by mouth every morning    5/9/2021 at AM     atorvastatin (LIPITOR) 40 MG tablet Take 40 mg by mouth At Bedtime   5/9/2021 at PM     Cholecalciferol (VITAMIN D3) 2000 UNIT CAPS Take 50 mcg by mouth daily    5/9/2021 at AM     co-enzyme Q-10 100 MG CAPS capsule Take 100 mg by mouth daily   5/9/2021 at AM     Cyanocobalamin (B-12) 1000 MCG TBCR Take 1,000 mcg by mouth daily   5/9/2021 at AM     DULoxetine (CYMBALTA) 30 MG capsule Take 60 mg by mouth daily (2 X 30MG)   5/10/2021 at 0500     hydrochlorothiazide (HYDRODIURIL) 25 MG tablet Take 25 mg by mouth daily   5/9/2021 at AM     HYDROcodone-acetaminophen (NORCO) 5-325 MG tablet Take 1 tablet by mouth daily as needed for severe pain   5/9/2021 at 1500     levothyroxine (TIROSINT) 150 MCG capsule  Take 150 mcg by mouth daily    5/10/2021 at 0500     losartan (COZAAR) 50 MG tablet Take 50 mg by mouth daily   5/9/2021 at AM     melatonin 5 MG CAPS Take 5 mg by mouth every evening    5/9/2021 at PM     metFORMIN (GLUCOPHAGE) 500 MG tablet Take 1,000 mg by mouth 2 times daily (with meals) (2 x 500mg)   5/9/2021 at pm     omeprazole (PRILOSEC) 20 MG DR capsule Take 20 mg by mouth daily as needed   Past Month at PRN       Allergies   Allergies   Allergen Reactions     Amoxicillin Nausea     Bees      Lisinopril Cough     Wasp Venom Protein      Other reaction(s): Edema       Physical Exam   Vital Signs: Temp: 97.7  F (36.5  C) Temp src: Axillary BP: 132/72 Pulse: 91   Resp: 18 SpO2: 94 % O2 Device: Nasal cannula Oxygen Delivery: 4 LPM  Weight: 255 lbs 0 oz    CONSTITUTIONAL: Pt laying in bed, dressed in hospital garb. Appears comfortable. Cooperative with interview. Accompanied by  at bedside.   HEENT: Normocephalic, atraumatic.   CARDIOVASCULAR: RRR, 2+ systolic murmur noted. No rubs or extra heart sounds appreciated. Pulses +2/4 and regular in upper and lower extremities, bilaterally.   RESPIRATORY: No increased work of breathing. CTA, bilat; no wheezes, rales, or rhonchi appreciated.  GASTROINTESTINAL:  Abdomen soft, non-distended. BS auscultated in all four quadrants. Negative for tenderness to palpation.  No masses or organomegaly noted.  MUSCULOSKELETAL: No gross deformities noted. Normal muscle tone.   HEMATOLOGIC/LYMPHATIC/IMMUNOLOGIC: Negative for lower extremity edema, bilaterally.  NEUROLOGIC: Alert and oriented to person, place, and time.  strength intact. No focal neuro deficits.   SKIN: Warm, dry, intact. No jaundice noted. Negative for suspicious lesions, rashes, bruising, open sores or abrasions.     Data   Results for orders placed or performed during the hospital encounter of 05/10/21 (from the past 24 hour(s))   Asymptomatic SARS-CoV-2 COVID-19 Virus (Coronavirus) by PCR    Specimen:  Nasopharyngeal   Result Value Ref Range    SARS-CoV-2 Virus Specimen Source Nasal     SARS-CoV-2 PCR Result NEGATIVE     SARS-CoV-2 PCR Comment (Note)    XR Knee Port Right 1/2 Views    Narrative    XR KNEE PORT RIGHT 1/2 VIEWS 5/10/2021 10:30 AM     HISTORY: Post-Op Total Knee       Impression    IMPRESSION:  Total knee arthroplasty placement with overlying skin  staples . There appear to be articular bodies posteriorly.    BAR KELLY MD   Creatinine   Result Value Ref Range    Creatinine 1.31 (H) 0.52 - 1.04 mg/dL    GFR Estimate 41 (L) >60 mL/min/[1.73_m2]    GFR Estimate If Black 48 (L) >60 mL/min/[1.73_m2]

## 2021-05-10 NOTE — PROGRESS NOTES
Paged as pt requesting resumption of her metformin. Diet has been advanced. Creatinine recheck midday 1.31 (baseline creatinine 1.1-1.3). Would recommend holding Metformin POD #0 and utilize sliding scale insulin with plan for resumption of Metformin in the AM if repeat renal fxn remains stable post-op.

## 2021-05-10 NOTE — PROCEDURES
OPERATIVE REPORT - TKA    DATE OF SERVICE:  5/10/2021    ATTENDING: LOUISE OSORIO    SURGEON:  LOUISE OSORIO    ASSISTANT:    Heidi Barbsoa PA-C    PREOPERATIVE DIAGNOSIS:  Right knee osteoarthritis    POSTOPERATIVE DIAGNOSIS:   Same     PROCEDURES PERFORMED:   Right Total Knee Arthroplasty.      ANESTHESIA:  1. General  2. Adductor canal block    ESTIMATED BLOOD LOSS:   25 mL    TRANSFUSIONS:  none    FLUIDS:   Per anesthesia    SPECIMENS:  Arthroplasty resection materials.    DRAIN:  None    COMPLICATIONS:  None    TOURNIQUET TIME:  54 minutes at 220 mmHg    INDICATIONS:   The patient is a very pleasant 70 year old female who has had persistent complaints of knee pain for some time now. The pain interferes with activities of daily living including sitting, standing, and ambulating short distances. The physical examination showed a painful and limited range of motion of the right knee.  The x-ray showed bone-on-bone arthritis of the right knee.     The patient has tried nonoperative measures to control their pain including Tylenol, oral anti-inflammatories, activity modification, low impact exercises, assistive devices, injections, none of which have provided satisfactory pain relief. The patient desires joint replacement of the knee to improve their lifestyle and reduce their pain.      Preoperatively, the risks, benefits, and possible complications of the procedure were discussed. The risks discussed included but were not limited to wear, loosening, infection, neurovascular damage, thromboembolic disease, foot drop, limb length inequality, persistent pain, stiffness and dislocation. All of the questions were satisfactorily answered and the patient wished to proceed with joint replacement surgery to improve their lifestyle and reduce their pain.     This patients BMI is 43.  This should qualify this patient for a 22 modifer for increased technical difficulty.  Positioning, exposure, component  placement and closure are all more challenging and require more time.  The case typically require a greater number of skilled assistance.      IMPLANTS:  1. Femoral component:  Errol Triathlon CR Rt size 3  2. Tibial component: Charlene Triathlon [place] size 4   3. Poly insert:  Errol Triathlon X3 CS 11 mm  4. Patella: Charlene Triathlon X3 33 x 9 mm symmetric  5. Bone cement:  Simplex    PROCEDURE  The patient was brought to the operating room after adequate induction of Anesthesia. The patient had a tourniquet placed on the thigh and the lower extremity were prepped and draped free in the usual sterile fashion using a Betadine scrub and ChloraPrep paint.    At this point a time-out procedure was carried out to identify the patient, the appropriate operative side, the implants, the code status, the fire risk, the preoperative medications and to confirm that the patient received 2 grams of ancef within one hour of the onset of the procedure.  Following completion of this, we proceeded with the case    The leg was elevated and exsanguinated, flexed to 90 degrees and the tourniquet was inflated to 220 mmHg. A midline incision was performed. This allowed creation of full thickness subcutaneous flaps.  A midvastus arthrotomy was used.  The meniscal tibial ligaments were released as feasible medially and laterally. The medial collateral ligament was subperiosteally elevated to the origin of the semimembranosus. The accessible anterior menisci were resected. The retropatellar fat pad was partially excised. The patella was dislocated into the lateral gutter and the knee was flexed to 90 degrees.     With the knee at 90 degrees flexion, the femoral intramedullary guide chari was placed and set for 5 degrees of valgus and impacted into position. The distal femoral resection was made.  The femur was sized to a 3, the 4-in-1 block was pinned in place with the appropriate amount of external rotation matching the  transepicondylar axis and perpendicular to [place]'s line.  The anterior posterior and chamfer osteotomies were completed. The residual osteophytes were removed from the periphery of the femur.     The tibia stabilized in an anterior subluxed position and neutral sagittal alignment. The intramedullary cutting apparatus was placed down. We pinned the block in place, performed the proximal tibial resection using the oscillating saw and cutting block with 3 degrees of posterior inclination.  The tibial resection was checked with the intramedullary based checking apparatus and rasp. The tibia was sized to a 4. We put the tibial component in the appropriate amount of external rotation with the center between the middle and medial thirds of the tibial tuberosity. The proximal tibia was filled with morselized bone graft to permit cement extravasation.     The posterior osteophytes were resected using the curved half inch osteotome. The patella was everted. The patellar osteotomy, free hand, using the oscillating saw. Multiple quadrants were checked multiple times until the appropriate thickness was confirmed with the caliper.  The patella sized for a 33 mm patella. The patella was drilled through the patellar template.    The trial components were placed.  The leg came out to full extension and was nicely balanced with an 11 mm CS insert.  The knee was stable to varus and valgus stress in full extension. and mid flexion.  Our patellar tracking was checked.  The patella showed no tilt or subluxation and the patella engaging both condyles at 90 degrees. Satisfied with the patellar alignment, without any need for further releases, we removed the trial components. We prepared the bony surface for cementing.    The bony surfaces of the femur, tibia and patella were prepared for cementing.  Pulse irrigation was used on the femur, tibia and patella. The surfaces were then gauze dried. The femur, tibia and patella were cemented  in place using vacuum mixed cement. Alignment, Range of Motion, Stability and Patellar tracking were appropriate.    The wound was closed in a layered fashion. The skin was brought together, everted and approximated with staples. Sterile dressings were applied. The patient was awakened and transported postanesthesia care in stable condition at the end of the case. Sponge and needle counts were correct.     Hemostasis was obtained throughout the procedure and prior to the closure of each layer using electrocautery. Pulsatile irrigation and dilute betadine irrigation were used throughout the procedure. Surgical Body Exhaust Systems and high exchange air flow were used during the procedure. The patient received 2 grams of ancef prior to tourniquet inflation and within 1 hours of the start of the procedure for antibiotic prophylaxis.    POSTOPERATIVE PLAN  1. WBAT right lower extremity.   2. Antibiotic Prophylaxis were given 2 grams of ancef. Antibiotics were given within 1 hour of surgical incision and discontinued within 24 hours.  3. Pain control with Oxycodone, Celebrex and Tylenol  4. Follow up with Dr. Guillermo in 10-14 days. [phone].   5.  DVT prophylaxis aspirin and sequential compression devices will be started within 24 hours of surgery.  6. Plan discharge when meeting therapy goals and patient is medically stable  7. Caution with NSAID usage.  8. Hold diuretic until renal function has stabilized.  9. Hold ARB until renal function has stabilized.    Ortega Guillermo MD  [facility]  [phone]  [phone]  Primary Care Physician Cherry Grace

## 2021-05-10 NOTE — ANESTHESIA POSTPROCEDURE EVALUATION
Patient: Mai Dutton    Procedure(s):  RIGHT TOTAL KNEE ARTHROPLASTY    Diagnosis:Osteoarthritis of right knee [M17.11]  Diagnosis Additional Information: No value filed.    Anesthesia Type:  General    Note:  Disposition: Inpatient   Postop Pain Control: Uneventful            Sign Out: Well controlled pain   PONV: No   Neuro/Psych: Uneventful            Sign Out: Acceptable/Baseline neuro status   Airway/Respiratory: Uneventful            Sign Out: Acceptable/Baseline resp. status   CV/Hemodynamics: Uneventful            Sign Out: Acceptable CV status   Other NRE: NONE   DID A NON-ROUTINE EVENT OCCUR? No           Last vitals:  Vitals:    05/10/21 1250 05/10/21 1350 05/10/21 1450   BP: 132/72 135/68 134/79   Pulse: 91 91 91   Resp: 18 22 20   Temp:      SpO2: 94% 94% 90%       Last vitals prior to Anesthesia Care Transfer:  CRNA VITALS  5/10/2021 0923 - 5/10/2021 1023      5/10/2021             NIBP:  139/64    Pulse:  90    NIBP Mean:  94    SpO2:  98 %    Resp Rate (observed):  17    Resp Rate (set):  10          Electronically Signed By: Trevon Oseguera MD  May 10, 2021  5:00 PM

## 2021-05-10 NOTE — PROVIDER NOTIFICATION
Text page sent to Mally YUNG: Pt is wondering if she can start her metformin tonight. She doesn't want sliding scale. Appetite is good. Glucose is 251.

## 2021-05-10 NOTE — ANESTHESIA PREPROCEDURE EVALUATION
Anesthesia Pre-Procedure Evaluation    Patient: Mai Dutton   MRN: [id number] : 1950        Preoperative Diagnosis: Osteoarthritis of right knee [M17.11]   Procedure : Procedure(s):  RIGHT TOTAL KNEE ARTHROPLASTY     Past Medical History:   Diagnosis Date     Anxiety      Arthritis      Cancer (H)     right breast CA     Cancer (H)     uterine CA     Depressive disorder      Diabetes (H)      Hyperlipidemia      Hypertension      Hypothyroidism      Obesity      Rheumatic fever     age 9     Skin cancer     melanoma     Sleep apnea     no Cpap     Spinal stenosis      Thyroid disease      Urge incontinence       Past Surgical History:   Procedure Laterality Date     BIOPSY      breast     BREAST SURGERY Bilateral 2011    bilateral mastectomy for right breast CA stage 3     COLONOSCOPY N/A 2019    Procedure: COLONOSCOPY;  Surgeon: Paty Aguillon MD;  Location:  GI     ENT SURGERY  age 9    tonsillectomy      EYE SURGERY Bilateral     lasik surgery     GYN SURGERY      vaginal hystectomy for uterine CA     ORTHOPEDIC SURGERY Left     left knee arthroscopy     SOFT TISSUE SURGERY      melanoma lesion, back --excision      Allergies   Allergen Reactions     Amoxicillin Nausea     Bees      Lisinopril Cough     Wasp Venom Protein      Other reaction(s): Edema      Social History     Tobacco Use     Smoking status: Never Smoker     Smokeless tobacco: Never Used   Substance Use Topics     Alcohol use: Yes     Comment: 1 beer per day      Wt Readings from Last 1 Encounters:   05/10/21 115.7 kg (255 lb)        Anesthesia Evaluation            ROS/MED HX  ENT/Pulmonary:     (+) sleep apnea,  (-) tobacco use and asthma   Neurologic: Comment: Spinal stenosis (lumbar)      Cardiovascular:     (+) Dyslipidemia hypertension-----    METS/Exercise Tolerance:     Hematologic:    (-) anemia   Musculoskeletal:   (+) arthritis,     GI/Hepatic:       Renal/Genitourinary:     (+) renal disease (mildly  elevated creatinine),     Endo:     (+) type II DM, thyroid problem, hypothyroidism, Obesity,     Psychiatric/Substance Use:     (+) psychiatric history anxiety and depression alcohol abuse     Infectious Disease:       Malignancy:   (+) Malignancy (hx endometrial cancer), History of Breast and Other.    Other:            Physical Exam    Airway        Mallampati: II   TM distance: > 3 FB   Neck ROM: full   Mouth opening: > 3 cm    Respiratory Devices and Support         Dental  no notable dental history         Cardiovascular          Rhythm and rate: regular and normal     Pulmonary   pulmonary exam normal                OUTSIDE LABS:  CBC:   Lab Results   Component Value Date    WBC 7.7 11/02/2011    HGB 12.3 11/02/2011    HCT 38.1 11/02/2011     11/02/2011     BMP:   Lab Results   Component Value Date     11/02/2011    POTASSIUM 4.0 11/02/2011    CHLORIDE 108 11/02/2011    CO2 27 11/02/2011    BUN 10 11/02/2011    CR 0.75 11/02/2011     (H) 11/02/2011     COAGS: No results found for: PTT, INR, FIBR  POC: No results found for: BGM, HCG, HCGS  HEPATIC:   Lab Results   Component Value Date    ALBUMIN 4.1 11/02/2011    PROTTOTAL 6.6 (L) 11/02/2011    ALT 25 11/02/2011    AST 17 11/02/2011    ALKPHOS 76 11/02/2011    BILITOTAL 0.3 11/02/2011     OTHER:   Lab Results   Component Value Date    RAFIQ 9.2 11/02/2011    LIPASE 47 11/02/2011       Anesthesia Plan    ASA Status:  3   NPO Status:  NPO Appropriate    Anesthesia Type: Spinal.              Consents    Anesthesia Plan(s) and associated risks, benefits, and realistic alternatives discussed. Questions answered and patient/representative(s) expressed understanding.     - Discussed with:  Patient         Postoperative Care    Pain management: IV analgesics, Peripheral nerve block (Single Shot).   PONV prophylaxis: Ondansetron (or other 5HT-3)     Comments:    Adductor canal nerve block            Trevon Oseguera MD

## 2021-05-10 NOTE — PLAN OF CARE
Patient vital signs are at baseline: Yes  Patient able to ambulate as they were prior to admission or with assist devices provided by therapies during their stay:  No,  Reason:  P.T. consult pending.  Patient MUST void prior to discharge:  Yes  Patient able to tolerate oral intake:  Yes  Pain has adequate pain control using Oral analgesics:  Yes    Patient arrived to unit from PACU at about 1145. A/Ox4, weaning off O2 - IS encouraged, voiding via bedpan, tolerating regular diet, PRN Oxycodone x1 with scheduled Tylenol given for pain, P.T. will see patient this afternoon, and patient is progressing toward plan of care.

## 2021-05-10 NOTE — ANESTHESIA PROCEDURE NOTES
"Adductor canal and Femoral Procedure Note  Pre-Procedure   Staff -        Anesthesiologist:  Trevon Oseguera MD       Performed By: Anesthesiologist       Location: pre-op       Pre-Anesthestic Checklist: patient identified, IV checked, site marked, risks and benefits discussed, informed consent, monitors and equipment checked, pre-op evaluation, at physician/surgeon's request and post-op pain management  Timeout:       Correct Patient: Yes        Correct Procedure: Yes        Correct Site: Yes        Correct Position: Yes        Correct Laterality: Yes        Site Marked: Yes  Procedure Documentation  Procedure: Adductor canal, Femoral       Laterality: right       Patient Position: supine       Patient Prep/Sterile Barriers: sterile gloves, mask, \"No-touch\" technique       Skin prep: Chloraprep       Local skin infiltrated with 3 mL of 1% lidocaine.        Insertion site: upper, medial thigh.       Needle Type: insulated (8 cm Pajunk)       Needle Gauge: 22.        Needle Length (millimeters): 90        - Ultrasound guided (permanent image entered into patient's record)       - Ultrasound used to identify targeted nerve, plexus, vascular marker, or fascial plane and place a needle adjacent to it in real-time       - Ultrasound was used to visualize the spread of anesthetic in close proximity to the above referenced structure       - A permanent image is entered into the patient's record.       - The nerve(s) appeared anatomically normal       - There were no apparent abnormal pathologic findings    Assessment/Narrative         The placement was negative for: blood aspirated, painful injection and site bleeding       Paresthesias: No.    Test dose of mL at.         Test dose negative, 3 minutes after injection, for signs of intravascular, subdural, or intrathecal injection.     Bolus given via needle..        Secured via.        Insertion/Infusion Method: Single Shot       Complications: none       Injection " made incrementally with aspirations every 5 mL.    Comments:  15 cc of 0.5% Bupivacaine with epinephrine (1:400K) injected on the anterior side of the femoral artery, in close proximity to the femoral nerve branches via the adductor canal approach.    Pt tolerated well.    No complications.      Ultrasound Interpretation, Peripheral Nerve Block    1. Under ultrasound guidance, the needle was inserted and placed in close proximity to the target nerve(s).  2. Ultrasound was also used to visualize the spread of the anesthetic in close proximity to the nerve(s) being blocked.  Local anesthetic was administered in incremental doses, with intermittent negative aspiration.    3. The nerve(s) appeared anatomically normal.  4. There were no apparent abnormal pathological findings.  5. A permanent ultrasound image was saved in the patient's record.    The surgeon has given a verbal order transferring care of this patient to me for the performance of a regional analgesia block for post-op pain control. It is requested of me because I am uniquely trained and qualified to perform this block and the surgeon is neither trained nor qualified to perform this procedure.    Trevon Oseguera MD   8:18 AM

## 2021-05-10 NOTE — ANESTHESIA CARE TRANSFER NOTE
Patient: Mai ANDREWS Ofacacia    Procedure(s):  RIGHT TOTAL KNEE ARTHROPLASTY    Diagnosis: Osteoarthritis of right knee [M17.11]  Diagnosis Additional Information: No value filed.    Anesthesia Type:   General     Note:    Oropharynx: oropharynx clear of all foreign objects  Level of Consciousness: drowsy  Oxygen Supplementation: face mask    Independent Airway: airway patency satisfactory and stable  Dentition: dentition unchanged  Vital Signs Stable: post-procedure vital signs reviewed and stable  Report to RN Given: handoff report given  Patient transferred to: PACU    Handoff Report: Identifed the Patient, Identified the Reponsible Provider, Reviewed the pertinent medical history, Discussed the surgical course, Reviewed Intra-OP anesthesia mangement and issues during anesthesia, Set expectations for post-procedure period and Allowed opportunity for questions and acknowledgement of understanding      Vitals: (Last set prior to Anesthesia Care Transfer)  CRNA VITALS  5/10/2021 0923 - 5/10/2021 1001      5/10/2021             NIBP:  139/64    Pulse:  90    NIBP Mean:  94    SpO2:  98 %    Resp Rate (observed):  17    Resp Rate (set):  10        Electronically Signed By: CHANG Cedillo CRNA  May 10, 2021  10:01 AM

## 2021-05-10 NOTE — ANESTHESIA PROCEDURE NOTES
Intrathecal Procedure Note  Pre-Procedure   Staff -        Anesthesiologist:  Trevon Oseguera MD       Performed By: Anesthesiologist       Location: OR       Pre-Anesthestic Checklist: patient identified, IV checked, site marked, risks and benefits discussed, informed consent, monitors and equipment checked, pre-op evaluation and at physician/surgeon's request  Timeout:       Correct Patient: Yes        Correct Procedure: Yes        Correct Site: Yes        Correct Position: Yes   Procedure Documentation  Procedure: intrathecal       Patient Position: sitting       Patient Prep/Sterile Barriers: sterile gloves, mask, patient draped       Skin prep: Betadine       Insertion Site: L3-4. (midline approach).       Spinal Needle Type: Sabrina tip       Introducer used       Introducer: 20 G       # of attempts: 2 and  # of redirects:  2    Assessment/Narrative         Paresthesias: Yes and Resolved (see comments below).       CSF fluid: clear.      Opening pressure was cmH2O while  Sitting.      Comments:  Patient sitting on edge of OR bed, lower back cleaned and prepped in sterile fashion with betadine. 1% lido used to numb area. Introducer placed, spinal needle through introducer. 2 levels attempted (L3/4, L2/3). Brief paresthesia to left hip at L3/4. Fully resolved. Osseous obstruction with multiple redirects.  Never reached dura or CSF. Spinal procedure aborted and converted to general anesthetic

## 2021-05-11 ENCOUNTER — APPOINTMENT (OUTPATIENT)
Dept: PHYSICAL THERAPY | Facility: CLINIC | Age: 71
End: 2021-05-11
Attending: ORTHOPAEDIC SURGERY
Payer: MEDICARE

## 2021-05-11 ENCOUNTER — APPOINTMENT (OUTPATIENT)
Dept: OCCUPATIONAL THERAPY | Facility: CLINIC | Age: 71
End: 2021-05-11
Attending: ORTHOPAEDIC SURGERY
Payer: MEDICARE

## 2021-05-11 VITALS
DIASTOLIC BLOOD PRESSURE: 63 MMHG | HEIGHT: 65 IN | TEMPERATURE: 98.3 F | HEART RATE: 80 BPM | OXYGEN SATURATION: 93 % | SYSTOLIC BLOOD PRESSURE: 134 MMHG | RESPIRATION RATE: 16 BRPM | WEIGHT: 255 LBS | BODY MASS INDEX: 42.49 KG/M2

## 2021-05-11 LAB
ALBUMIN SERPL-MCNC: 3 G/DL (ref 3.4–5)
ALP SERPL-CCNC: 143 U/L (ref 40–150)
ALT SERPL W P-5'-P-CCNC: 59 U/L (ref 0–50)
ANION GAP SERPL CALCULATED.3IONS-SCNC: 3 MMOL/L (ref 3–14)
AST SERPL W P-5'-P-CCNC: 42 U/L (ref 0–45)
BILIRUB SERPL-MCNC: 0.3 MG/DL (ref 0.2–1.3)
BUN SERPL-MCNC: 34 MG/DL (ref 7–30)
CALCIUM SERPL-MCNC: 8.1 MG/DL (ref 8.5–10.1)
CHLORIDE SERPL-SCNC: 109 MMOL/L (ref 94–109)
CO2 SERPL-SCNC: 26 MMOL/L (ref 20–32)
CREAT SERPL-MCNC: 1.25 MG/DL (ref 0.52–1.04)
ERYTHROCYTE [DISTWIDTH] IN BLOOD BY AUTOMATED COUNT: 13.8 % (ref 10–15)
GFR SERPL CREATININE-BSD FRML MDRD: 44 ML/MIN/{1.73_M2}
GLUCOSE BLDC GLUCOMTR-MCNC: 127 MG/DL (ref 70–99)
GLUCOSE BLDC GLUCOMTR-MCNC: 141 MG/DL (ref 70–99)
GLUCOSE BLDC GLUCOMTR-MCNC: 177 MG/DL (ref 70–99)
GLUCOSE SERPL-MCNC: 129 MG/DL (ref 70–99)
HBA1C MFR BLD: 6.6 % (ref 0–5.6)
HCT VFR BLD AUTO: 32.6 % (ref 35–47)
HGB BLD-MCNC: 10.2 G/DL (ref 11.7–15.7)
MCH RBC QN AUTO: 28.7 PG (ref 26.5–33)
MCHC RBC AUTO-ENTMCNC: 31.3 G/DL (ref 31.5–36.5)
MCV RBC AUTO: 92 FL (ref 78–100)
PLATELET # BLD AUTO: 256 10E9/L (ref 150–450)
POTASSIUM SERPL-SCNC: 4.3 MMOL/L (ref 3.4–5.3)
PROT SERPL-MCNC: 6.5 G/DL (ref 6.8–8.8)
RBC # BLD AUTO: 3.55 10E12/L (ref 3.8–5.2)
SODIUM SERPL-SCNC: 138 MMOL/L (ref 133–144)
WBC # BLD AUTO: 11.7 10E9/L (ref 4–11)

## 2021-05-11 PROCEDURE — 97535 SELF CARE MNGMENT TRAINING: CPT | Mod: GO,59 | Performed by: OCCUPATIONAL THERAPIST

## 2021-05-11 PROCEDURE — 83036 HEMOGLOBIN GLYCOSYLATED A1C: CPT | Performed by: PHYSICIAN ASSISTANT

## 2021-05-11 PROCEDURE — 250N000009 HC RX 250: Performed by: SPECIALIST/TECHNOLOGIST

## 2021-05-11 PROCEDURE — 85027 COMPLETE CBC AUTOMATED: CPT | Performed by: HOSPITALIST

## 2021-05-11 PROCEDURE — 80053 COMPREHEN METABOLIC PANEL: CPT | Performed by: HOSPITALIST

## 2021-05-11 PROCEDURE — 97110 THERAPEUTIC EXERCISES: CPT | Mod: GP,CQ | Performed by: PHYSICAL THERAPY ASSISTANT

## 2021-05-11 PROCEDURE — 99214 OFFICE O/P EST MOD 30 MIN: CPT | Performed by: HOSPITALIST

## 2021-05-11 PROCEDURE — 99207 PR CDG-CODE CATEGORY CHANGED: CPT | Performed by: HOSPITALIST

## 2021-05-11 PROCEDURE — 97116 GAIT TRAINING THERAPY: CPT | Mod: GP,CQ | Performed by: PHYSICAL THERAPY ASSISTANT

## 2021-05-11 PROCEDURE — 250N000013 HC RX MED GY IP 250 OP 250 PS 637: Performed by: PHYSICIAN ASSISTANT

## 2021-05-11 PROCEDURE — 97165 OT EVAL LOW COMPLEX 30 MIN: CPT | Mod: GO | Performed by: OCCUPATIONAL THERAPIST

## 2021-05-11 PROCEDURE — 36415 COLL VENOUS BLD VENIPUNCTURE: CPT | Performed by: HOSPITALIST

## 2021-05-11 PROCEDURE — 97530 THERAPEUTIC ACTIVITIES: CPT | Mod: GP,CQ | Performed by: PHYSICAL THERAPY ASSISTANT

## 2021-05-11 PROCEDURE — 250N000011 HC RX IP 250 OP 636: Performed by: SPECIALIST/TECHNOLOGIST

## 2021-05-11 PROCEDURE — 82962 GLUCOSE BLOOD TEST: CPT

## 2021-05-11 PROCEDURE — 250N000013 HC RX MED GY IP 250 OP 250 PS 637: Performed by: SPECIALIST/TECHNOLOGIST

## 2021-05-11 RX ORDER — POLYETHYLENE GLYCOL 3350 17 G/17G
1 POWDER, FOR SOLUTION ORAL DAILY
Qty: 7 PACKET | Refills: 0 | Status: SHIPPED | OUTPATIENT
Start: 2021-05-11 | End: 2024-06-24

## 2021-05-11 RX ORDER — AMOXICILLIN 250 MG
1-2 CAPSULE ORAL 2 TIMES DAILY
Qty: 30 TABLET | Refills: 0 | Status: SHIPPED | OUTPATIENT
Start: 2021-05-11 | End: 2024-06-24

## 2021-05-11 RX ADMIN — INSULIN ASPART 1 UNITS: 100 INJECTION, SOLUTION INTRAVENOUS; SUBCUTANEOUS at 07:00

## 2021-05-11 RX ADMIN — ACETAMINOPHEN 975 MG: 325 TABLET, FILM COATED ORAL at 06:53

## 2021-05-11 RX ADMIN — OXYCODONE HYDROCHLORIDE 5 MG: 5 TABLET ORAL at 06:59

## 2021-05-11 RX ADMIN — POLYETHYLENE GLYCOL 3350 17 G: 17 POWDER, FOR SOLUTION ORAL at 09:26

## 2021-05-11 RX ADMIN — DOCUSATE SODIUM 100 MG: 100 CAPSULE, LIQUID FILLED ORAL at 09:25

## 2021-05-11 RX ADMIN — ANASTROZOLE 1 MG: 1 TABLET, COATED ORAL at 09:25

## 2021-05-11 RX ADMIN — FAMOTIDINE 20 MG: 10 INJECTION, SOLUTION INTRAVENOUS at 09:20

## 2021-05-11 RX ADMIN — DULOXETINE HYDROCHLORIDE 60 MG: 30 CAPSULE, DELAYED RELEASE ORAL at 09:25

## 2021-05-11 RX ADMIN — OXYCODONE HYDROCHLORIDE 5 MG: 5 TABLET ORAL at 03:10

## 2021-05-11 RX ADMIN — ASPIRIN 81 MG: 81 TABLET, DELAYED RELEASE ORAL at 09:25

## 2021-05-11 RX ADMIN — HYDROMORPHONE HYDROCHLORIDE 0.4 MG: 1 INJECTION, SOLUTION INTRAMUSCULAR; INTRAVENOUS; SUBCUTANEOUS at 09:19

## 2021-05-11 RX ADMIN — ACETAMINOPHEN 975 MG: 325 TABLET, FILM COATED ORAL at 13:06

## 2021-05-11 RX ADMIN — OXYCODONE HYDROCHLORIDE 10 MG: 5 TABLET ORAL at 11:03

## 2021-05-11 RX ADMIN — SENNOSIDES AND DOCUSATE SODIUM 1 TABLET: 8.6; 5 TABLET ORAL at 09:24

## 2021-05-11 ASSESSMENT — ACTIVITIES OF DAILY LIVING (ADL): PREVIOUS_RESPONSIBILITIES: MEAL PREP

## 2021-05-11 NOTE — PROGRESS NOTES
[facility]  Hospitalist Progress Note   05/11/2021          Assessment and Plan:       Mai Dutton is a 70 year old female with PMHx of hypertension, GERD, NIDDM, MARLYN, lymphedema, and breast cancer admitted on 5/10/2021 and underwent a right total knee arthroplasty for right knee osteoarthritis. Hospitalist service was consulted for medical co-management.      S/P right total knee arthroplasty: 5/10/2021  Surgery for right knee osteoarthritis. Surgery per Dr. Guillermo.   General anesthesia + adductor canal block. EBL 25 ccs.   Defer routine post-operative cares, IVF, DVT prophylaxis and pain control to primary service   Perioperative Ancef   DVT prophy with ASA 81 mg BID  Per ortho  Defer Analgesic regimen to Ortho   Encourage pulmonary toilet; incentive spirometer  Bowel regimen in place while on narcotics   PT and OT in the AM     Medically complicated obesity with a BMI of 42.43.  Need to consider lifestyle modification with diet and exercise as able to.     Hypertension  Hyperlipidemia:   Continue PTA Atorvastatin.   Restart PTA hydrochlorothiazide and Losartan at time of discharge.  Check BMP in 1 to 2 weeks or earlier if symptomatic.       CKD III: Baseline creatnine 1.1-1.3.   Renal function is baseline.  Avoid nephrotoxic drugs.  Check BMP in 1 to 2 weeks or earlier if symptomatic.       T2DM, non-insulin dependent: A1C 6.9 on 4/20/21  PTA Metformin held during hospitalization, was on sliding scale insulin.  Restarted at time of discharge.  Monitor renal function closely while on Metformin.     MARLYN: Not on CPAP.  Consider sleep studies as outpatient.     Alcohol use: Drinks a couple glasses of wine each night. No hx of withdrawal reported.   Will need to quit or consider cutting down on alcohol use.     Hx of breast cancer:   Follows with Dr. Cliff Contreras.   Hx of bilat mastectomy in 2011 with neoadjuvant chemo for positive noted.   Continue PTA Arimidex      Endometrial cancer: S/P  BILL/BSO. Follows with Dr. Neumann.      Urge incontinence   Recent UTI:   At pre-op physical, pt was noted to have asymptomatic pyuria with culture growing E. Coli. Treated with Keflex. .        Orders Placed This Encounter      Advance Diet as Tolerated: Regular Diet Adult      Discharge Instruction - Regular Diet Adult      Diet      DVT Prophylaxis: SCD, pharmacological DVT prophylaxis per orthopedic team.  Code Status: Full Code  Disposition: Expected discharge per orthopedic team.    Discussed with patient, bedside RN,  by the bedside.  More than 60% of time spent in direct patient care, care coordination, patient/caregiver counseling, and formalizing plan of care.   Octaviano Ramires MD        Interval History:      Sitting up in bed.  Complaining of pain in her right knee.  Has not ambulated with therapy yet.  Denies any chest pain or palpitation.  Denies any headache or dizziness.  Denies any nausea vomiting.  No urinary disturbance, has had her Garcia catheter removed earlier this morning.         Physical Exam:        Physical Exam   Temp:  [97.6  F (36.4  C)-98.7  F (37.1  C)] 98.3  F (36.8  C)  Pulse:  [80-94] 80  Resp:  [16-22] 16  BP: (124-140)/(63-79) 134/63  SpO2:  [90 %-95 %] 93 %    Intake/Output Summary (Last 24 hours) at 5/11/2021 1344  Last data filed at 5/10/2021 2211  Gross per 24 hour   Intake 2272 ml   Output 100 ml   Net 2172 ml       Admission Weight: 115.7 kg (255 lb)  Current Weight: 115.7 kg (255 lb)    PHYSICAL EXAM  GENERAL: Patient is in no distress. Alert and oriented.  HEENT: Oropharynx pink, moist.   HEART: Regular rate and rhythm. S1S2. No murmurs  LUNGS: Decreased breath sounds, no wheezing no crackles.  Respirations unlabored  ABDOMEN: Soft, no abdominal tenderness, bowel sounds heard   NEURO: Moving all extremities.  EXTREMITIES: trace pedal edema.   SKIN: Warm, dry. No rash or bruising.  PSYCHIATRY Cooperative       Medications:          acetaminophen  975 mg Oral Q8H      anastrozole  1 mg Oral QAM     aspirin  81 mg Oral BID w/meals     atorvastatin  40 mg Oral At Bedtime     docusate sodium  100 mg Oral BID     DULoxetine  60 mg Oral Daily     famotidine  20 mg Oral BID    Or     famotidine  20 mg Intravenous BID     insulin aspart  1-7 Units Subcutaneous TID AC     insulin aspart  1-5 Units Subcutaneous At Bedtime     levothyroxine  150 mcg Oral QAM AC     polyethylene glycol  17 g Oral Daily     senna-docusate  1 tablet Oral BID     sodium chloride (PF)  3 mL Intracatheter Q8H     [START ON 5/13/2021] acetaminophen, sore throat lozenge, bisacodyl, glucose **OR** dextrose **OR** glucagon, diphenhydrAMINE, HYDROmorphone **OR** HYDROmorphone, lidocaine 4%, lidocaine (buffered or not buffered), magnesium hydroxide, melatonin, naloxone **OR** naloxone **OR** naloxone **OR** naloxone, ondansetron **OR** ondansetron, oxyCODONE **OR** oxyCODONE, prochlorperazine **OR** prochlorperazine, sodium chloride (PF), sodium phosphate         Data:      All new lab and imaging data was reviewed.

## 2021-05-11 NOTE — PLAN OF CARE
Alert and oriented x 4. VSS on RA. CMS intact. Pain controlled with PRN oxycodone and scheduled tylenol. Tolerating regular diet. Ax1 gb/w to BR, WBAT. Voiding adequately and continent. BS active, but no BM yet. IVSL. R knee dressing CDI with scant dried drainage. PT and OT recommending homecare. SW putting out referrals. Per MD, okay to discharge back to home today. Will complete discharge when orders placed.

## 2021-05-11 NOTE — PROGRESS NOTES
05/10/21 1653   Quick Adds   Type of Visit Initial PT Evaluation   Living Environment   People in home spouse   Current Living Arrangements house   Home Accessibility stairs to enter home;stairs within home   Transportation Anticipated car, drives self;family or friend will provide   Living Environment Comments 2 story house   Self-Care   Usual Activity Tolerance good   Current Activity Tolerance moderate   Regular Exercise No   Equipment Currently Used at Home none   Activity/Exercise/Self-Care Comment independent with ADLs, ambulates without AD but very slowly at baseline,    Disability/Function   Walking or Climbing Stairs Difficulty yes   Walking or Climbing Stairs stair climbing difficulty, requires equipment   Fall history within last six months yes   Number of times patient has fallen within last six months 1  (can't remember how she fell but couldn't get up)   Change in Functional Status Since Onset of Current Illness/Injury yes   General Information   Onset of Illness/Injury or Date of Surgery 05/10/21   Referring Physician Dr. Guillermo   Patient/Family Therapy Goals Statement (PT) to go home and have OP PT   Pertinent History of Current Problem (include personal factors and/or comorbidities that impact the POC) Pt is a 70 year old female POD#0 TKA   Existing Precautions/Restrictions fall;oxygen therapy device and L/min   Weight-Bearing Status - RLE weight-bearing as tolerated   Cognition   Orientation Status (Cognition) oriented to;person;place;situation   Pain Assessment   Patient Currently in Pain Yes, see Vital Sign flowsheet   Integumentary/Edema   Integumentary/Edema Comments right LE wrapped   Range of Motion (ROM)   ROM Comment WFL except for right LE consisten with surgery   Strength   Strength Comments WFL except for right LE consistent with surgery   Bed Mobility   Comment (Bed Mobility) Supine to sit: Juan C   Transfers   Transfer Safety Comments sit to stand: cues and modA   Gait/Stairs  (Locomotion)   Distance in Feet (Required for LE Total Joints) 75ft   Comment (Gait/Stairs) Pt ambulates with FWW with Juan C. Pt has very slow pace, with decreased step length, step to pattern and flexed posture.   Balance   Balance Comments requires AD   Sensory Examination   Sensory Perception patient reports no sensory changes   Coordination   Coordination no deficits were identified   Muscle Tone   Muscle Tone no deficits were identified   Clinical Impression   Criteria for Skilled Therapeutic Intervention yes, treatment indicated   PT Diagnosis (PT) impaired gait   Influenced by the following impairments pain in right knee and decreased ROM and strength, decreased exercise tolerance   Functional limitations due to impairments below baseline for bed mob, transfers and gait   Clinical Presentation Stable/Uncomplicated   Clinical Presentation Rationale clinical judgment   Clinical Decision Making (Complexity) low complexity   Therapy Frequency (PT) 2x/day   Predicted Duration of Therapy Intervention (days/wks) 2 days   Planned Therapy Interventions (PT) bed mobility training;gait training;home exercise program;patient/family education;ROM (range of motion);strengthening;transfer training   Anticipated Equipment Needs at Discharge (PT) walker, rolling   Risk & Benefits of therapy have been explained evaluation/treatment results reviewed;care plan/treatment goals reviewed;risks/benefits reviewed;participants voiced agreement with care plan;patient;spouse/significant other   PT Discharge Planning    PT Discharge Recommendation (DC Rec) home with assist;home with outpatient physical therapy   PT Rationale for DC Rec Pt is below baseline for all mobility and needs assist of one for bed mobility, transfers and gait and anticipate pt will be able to go home with a walker and assist from spouse for mobility and ADLs including stairs. Pt will need continued rehab at OP PT to maximize knee strength and ROM and independence    PT Brief overview of current status  Juan C bed mob, modA sit to stand, gait 75ft with walker and Juan C   Total Evaluation Time   Total Evaluation Time (Minutes) 5

## 2021-05-11 NOTE — PLAN OF CARE
Up with assist of 1 to bathroom. Voiding adequately. Tolerating regular diet. Saline locked. Weaned to 1L nasal cannula. CMS intact. Dressing CDI. Discharge likely tomorrow.

## 2021-05-11 NOTE — PLAN OF CARE
Patient vital signs are at baseline: Yes  Patient able to ambulate as they were prior to admission or with assist devices provided by therapies during their stay:  Yes  Patient MUST void prior to discharge:  Yes  Patient able to tolerate oral intake:  Yes  Pain has adequate pain control using Oral analgesics:  Yes     Pt A/O x4, CMS intact, VSS on 1 LPM. R. Knee dressing CDI. Pain managed with scheduled tylenol and PRN Oxy. A1 with walker to bathroom, tolerating oral food, saline locked/ Pt progressing towards care plan, continue to monitor.

## 2021-05-11 NOTE — PROGRESS NOTES
05/11/21 1100   Quick Adds   Type of Visit Initial Occupational Therapy Evaluation   Living Environment   People in home spouse   Current Living Arrangements house   Home Accessibility stairs to enter home;stairs within home   Transportation Anticipated car, drives self;family or friend will provide   Living Environment Comments 2 story home--full bathroom on upstairs w/ both a walk-in shower and tub/shower combo.    Self-Care   Usual Activity Tolerance good   Current Activity Tolerance moderate   Regular Exercise No   Equipment Currently Used at Home none  (pt. has purchased RTS, plans to get shower chair)   Activity/Exercise/Self-Care Comment independent with ADLs, ambulates without AD but very slowly at baseline;pt. has a reacher, sock-aide and [facility]   Disability/Function   Walking or Climbing Stairs Difficulty yes   Walking or Climbing Stairs stair climbing difficulty, requires equipment   Fall history within last six months no   Change in Functional Status Since Onset of Current Illness/Injury yes   General Information   Onset of Illness/Injury or Date of Surgery 05/10/21   Referring Physician Dr. Guillermo  (Heidi Doshi PA-C )   Patient/Family Therapy Goal Statement (OT)   (Plans ot discharge home today)   Additional Occupational Profile Info/Pertinent History of Current Problem OT:Mai Dutton is a 70 year old female with PMHx of hypertension, GERD, NIDDM, MARLYN, lymphedema, and breast cancer admitted on 5/10/2021 and underwent a right total knee arthroplasty for right knee osteoarthritis.    Performance Patterns (Routines, Roles, Habits) Pt. reports she does the cooking, spouse does the cleaning and laundry   Existing Precautions/Restrictions fall   Right Lower Extremity (Weight-bearing Status) weight-bearing as tolerated (WBAT)   General Observations and Info Pt. up in WC, spouse present, agreeable to OT, moderate pain reported   Cognitive Status Examination   Cognitive Status Comments intact    Visual Perception   Impact of Vision Impairment on Function (Vision) no vision prblems noted/reported   Sensory   Sensory Comments no numbness/tingling reported   Pain Assessment   Patient Currently in Pain Yes, see Vital Sign flowsheet  (R knee)   Integumentary/Edema   Integumentary/Edema Comments BLE edema--has h/o lymphedema   Posture   Posture forward head position   Range of Motion Comprehensive   Comment, General Range of Motion BUE WNL/WFL   Strength Comprehensive (MMT)   Comment, General Manual Muscle Testing (MMT) Assessment BUE WNL/WFL   Coordination   Upper Extremity Coordination No deficits were identified   Bed Mobility   Comment (Bed Mobility) SBA   Transfers   Transfer Comments sit-stand SBA-CGA/WW   Balance   Balance Comments impaired;overall SBA-CGA/vc's/WW for room mobility   Lower Body Dressing Assessment/Training   [place] Level (Lower Body Dressing) minimum assist (75% patient effort)   Toileting   [place] Level (Toileting) supervision;verbal cues  (SBA)   Comment (Toileting) has RTS/ support on R side at home   Instrumental Activities of Daily Living (IADL)   Previous Responsibilities meal prep   IADL Comments spouse able to assist at home   Clinical Impression   Criteria for Skilled Therapeutic Interventions Met (OT) yes   OT Diagnosis Decline in ADL performance   OT Problem List-Impairments impacting ADL problems related to;balance;mobility;range of motion (ROM);strength;pain  (decreased LE strength/ROM)   Assessment of Occupational Performance 3-5 Performance Deficits   Identified Performance Deficits dressing, toileting, bathing, grooming, fx. transfers, IADL's   Planned Therapy Interventions (OT) ADL retraining   Clinical Decision Making Complexity (OT) low complexity   Therapy Frequency (OT) 1x eval and treat   Predicted Duration of Therapy 1X eval and treat   Anticipated Equipment Needs Upon Discharge (OT) dressing equipment;reacher;shower chair;other (see comments)  (linda  bar)   Risk & Benefits of therapy have been explained evaluation/treatment results reviewed;care plan/treatment goals reviewed;risks/benefits reviewed;current/potential barriers reviewed;participants voiced agreement with care plan;participants included;patient;spouse/significant other   OT Discharge Planning    OT Rationale for DC Rec Overall, pt. moving well, has met acute OT gaols, will have spouse to assist / I/ADl's as needed. Noted pt. also to get Home PT.   OT Brief overview of current status  overall SBA-CGA w/ transfers, room mobility using WW, SBA-min. A LE dressing   Therapy Certification   Start of Care Date 05/11/21   Certification date from 05/11/21   Certification date to 05/12/21   Medical Diagnosis R TKA   Total Evaluation Time (Minutes)   Total Evaluation Time (Minutes) 10

## 2021-05-11 NOTE — CONSULTS
Care Management Discharge Note    Discharge Date: 05/11/21       Discharge Disposition:  Home w spouse    Discharge Services: HHPT     Discharge DME:  walker    Discharge Transportation: car, drives self, family or friend will provide    Private pay costs discussed: Not applicable    PAS Confirmation Code:  NA  Patient/family educated on Medicare website which has current facility and service quality ratings:      Education Provided on the Discharge Plan:    Persons Notified of Discharge Plans: patient/spouse  Patient/Family in Agreement with the Plan:  yes    Handoff Referral Completed: No    Additional Information:  Met with patient/spouse at bedside. Discharge orders are in for HHPT. She wishes to use [facility]. Referral sent to check availability and insurance coverage  [facility] able to accept patient, SOC 5/12-13

## 2021-05-11 NOTE — DISCHARGE SUMMARY
"HOSPITAL DISCHARGE SUMMARY    Patient Name: Mai Dutton  YOB: 1950  Age: 70 year old  Medical Record Number: [id number]  Primary Physician: Cherry Grace  Phone: None  Admission Date: 5/10/2021  Discharge Date: 5/11/2021    She will be discharged from [facility] to discharge destination: Home with Home Care    PRINCIPAL DISCHARGE DIAGNOSIS: right kneeDJD  Patient Active Problem List   Diagnosis     Osteoarthritis of right knee, unspecified osteoarthritis type        PROCEDURES PERFORMED DURING HOSPITALIZATION: Total knee arthroplasty right side    BRIEF HOSPITAL COURSE: This is a pleasant 70 year old female who has had persistent complaints of pain that has failed non-operative management. Preoperative x-rays revealed arthritic changes in the right knee.  The risks, benefits and possible complications of the above procedure were discussed with the patient. Patient elected to proceed to improve their lifestyle. Informed consent was obtained. For further details, please refer to the H&P in the chart.    The patient was admitted on 5/10/2021 and underwent the above-mentioned procedure. Patient tolerated this procedure well. Postoperatively, patient was seen in consultation by the internal medicine hospitalist service. Throughout the hospitalization, wound remained clean. The patient was started and advanced in a diet. CMS remained intact. Patient was seen and evaluated by physical therapy and rehab program was initiated. Patient was also seen by occupational therapy. Hemoglobin on day of discharge was stable.    COMPLICATIONS IN HOSPITAL: complications: None    PERTINENT FINDINGS/RESULTS AT DISCHARGE:   Blood pressure 134/63, pulse 80, temperature 98.3  F (36.8  C), temperature source Oral, resp. rate 16, height 1.651 m (5' 5\"), weight 115.7 kg (255 lb), SpO2 93 %.      Subjective: Patient feels good today.  Pain controlled. Discharge instructions reviewed.      PE:   The " "patient is awake and alert  Calves are soft and non-tender.   Sensation is intact.  Dorsiflexion and plantar flexion is intact.  Foot warm with nl cap refill.  The incision is incision: covered.      Latest Laboratory Results:  Chem:  Recent Labs   Lab Test 05/11/21  0701   POTASSIUM 4.3     WBC/Hgb:  Recent Labs   Lab Test 05/11/21 0701   WBC 11.7*   HGB 10.2*     INR:  No results for input(s): INR in the last 11577 hours.  No components found for: [id number]    IMPORTANT PENDING TEST RESULTS: None    CONDITION AT DISCHARGE:    discharge condition: Stabilized    DISCHARGE ORDERS    Discharge Orders        Ambulatory Referral to Home Health      Reason for your hospital stay    Right TKA     When to call - Contact Surgeon Team    You may experience symptoms that require follow-up before your scheduled appointment. Refer to the \"Stoplight Tool\" for instructions on when to contact your Surgeon Team if you are concerned about pain control, blood clots, constipation, or if you are unable to urinate.     When to call - Reach out to Urgent Care    If you are not able to reach your Surgeon Team and you need immediate care, go to the Orthopedic Walk-in Clinic or Urgent Care at your Surgeon's office.  Do NOT go to the Emergency Room unless you have shortness of breath, chest pain, or other signs of a medical emergency.     When to call - Reasons to Call 911    Call 911 immediately if you experience sudden-onset chest pain, arm weakness/numbness, slurred speech, or shortness of breath     Discharge Instruction - Breathing exercises    Perform breathing exercises using your Incentive Spirometer 10 times per hour while awake for 2 weeks.     Symptoms - Fever Management    A low grade fever can be expected after surgery.  Use acetaminophen (TYLENOL) as needed for fever management.  Contact your Surgeon Team if you have a fever greater than 101.5 F, chills, and/or night sweats.     Symptoms - Constipation management    " Constipation (hard, dry bowel movements) is expected after surgery due to the combination of being less active, the anesthetic, and the opioid pain medication.  You can do the following to help reduce constipation:  ~  FLUIDS:  Drink clear liquids (water or Gatorade), or juice (apple/prune).  ~  DIET:  Eat a fiber rich diet.    ~  ACTIVITY:  Get up and move around several times a day.  Increase your activity as you are able.  MEDICATIONS:  Reduce the risk of constipation by starting medications before you are constipated.  You can take Miralax   (1 packet as directed) and/or a stool softener (Senokot 1-2 tablets 1-2 times a day).  If you already have constipation and these medications are not working, you can get magnesium citrate and use as directed.  If you continue to have constipation you can try an over the counter suppository or enema.  Call your Surgeon Team if it has been greater than 3 days since your last bowel movement.     Symptoms - Reduced Urine Output    Changes in the amount of fluids you drank before and after surgery may result in problems urinating.  It is important to stay well-hydrated after surgery and drink plenty of water. If it has been greater than 8 hours since you have urinated despite drinking plenty of water, call your Surgeon Team.     Activity - Exercises to prevent blood clots    Unless otherwise directed by your Surgeon team, perform the following exercises at least three times per day for the first four weeks after surgery to prevent blood clots in your legs: 1) Point and flex your feet (Ankle Pumps), 2) Move your ankle around in big circles, 3) Wiggle your toes, 4) Walk, even for short distances, several times a day, will help decrease the risk of blood clots.     Comfort and Pain Management - Pain after Surgery    Pain after surgery is normal and expected.  You will have some amount of pain for several weeks after surgery.  Your pain will improve with time.  There are several  things you can do to help reduce your pain including: rest, ice, elevation, and using pain medications as needed. Contact your Surgeon Team if you have pain that persists or worsens after surgery despite rest, ice, elevation, and taking your medication(s) as prescribed. Contact your Surgeon Team if you have new numbness, tingling, or weakness in your operative extremity.     Comfort and Pain Management - Swelling after Surgery    Swelling and/or bruising of the surgical extremity is common and may persist for several months after surgery. In addition to frequent icing and elevation, gentle compressive support with an ACE wrap or tubigrip may help with swelling. Apply compression regularly, removing at least twice daily to perform skin checks. Contact your Surgeon Team if your swelling increases and is NOT associated with an increase in your activity level, or if your swelling increases and is associated with redness and pain.     Comfort and Pain Management - Cold therapy    Ice can be used to control swelling and discomfort after surgery. Place a thin towel over your operative site and apply the ice pack overtop. Leave ice pack in place for 20 minutes, then remove for 20 minutes. Repeat this 20 minutes on/20 minutes off routine as often as tolerated.     Medication Instructions - Acetaminophen (TYLENOL) Instructions    You were discharged with acetaminophen (TYLENOL) for pain management after surgery. Acetaminophen most effectively manages pain symptoms when it is taken on a schedule without missing doses (every four, six, or eight hours). Your Provider will prescribe a safe daily dose between 3000 - 4000 mg.  Do NOT exceed this daily dose. Most patients use acetaminophen for pain control for the first four weeks after surgery.  You can wean from this medication as your pain decreases.     Medication Instructions - NSAID Instructions    You were discharged with an anti-inflammatory medication for pain management to  "use in combination with acetaminophen (TYLENOL) and the narcotic pain medication.  Take this medication exactly as directed.  You should only take one anti-inflammatory at a time.  Some common anti-inflammatories include: ibuprofen (ADVIL, MOTRIN), naproxen (ALEVE, NAPROSYN), celecoxib (CELEBREX), meloxicam (MOBIC), ketorolac (TORADOL).  Take this medication with food and water.     Medication Instructions - Opioids - Tapering Instructions    In the first three days following surgery, your symptoms may warrant use of the narcotic pain medication every four to six hours as prescribed. This is normal. As your pain symptoms improve, focus your efforts on decreasing (tapering) use of narcotic medications. The most successful tapering strategy is to first, decrease the number of tablets you take every 4-6 hours to the minimum prescribed. Then, increase the amount of time between doses.  For example:  First, taper to   or 1 tablet every 4-6 hours.  Then, taper to   or 1 tablet every 6-8 hours.  Then, taper to   or 1 tablet every 8-10 hours.  Then, taper to   or 1 tablet every 10-12 hours.  Then, taper to   or 1 tablet at bedtime.  The bedtime dose can help with comfort during sleep and is typically the last dose to be discontinued after surgery.     Follow Up Care    Follow-up with your Surgeon Team in 10-14 days for wound check.     Medication instructions -  Anticoagulation - aspirin    Take the aspirin as prescribed for a total of 6 weeks after surgery.  This is given to help minimize your risk of blood clot.     Comfort and Pain Management - LOWER Extremity Elevation    Swelling is expected for several months after surgery. This type of swelling is usually associated with gravity and activity, and can be improved with elevation.   The best way to do this is to get your \"toes above your nose\" by laying down and placing several pillows lengthwise under your calf and heel. When elevating your leg keep your knee " completely straight. Perform this elevation as often as possible especially for the first two weeks after surgery.     Medication Instructions - Opioid Instructions (Less than 65 years)    You were discharged with an opioid medication (hydromorphone, oxycodone, hydrocodone, or tramadol). This medication should only be taken for breakthrough pain that is not controlled with acetaminophen (TYLENOL). If you rate your pain less than 3 you do not need this medication.  Pain rating 0-3:  You do not need this medication.  Pain rating 4-6:  Take 1 tablet every 4-6 hours as needed  Pain rating 7-10:  Take 2 tablets every 4-6 hours as needed.  Do not exceed 6 tablets per day     Activity - Total Knee Arthroplasty     Return to Driving    Return to driving - Driving is NOT permitted until directed by your provider. Under no circumstance are you permitted to drive while using narcotic pain medications.     Dressing / Wound Care - Wound    You have a clean dressing on your surgical wound. Dressing change instructions as follows: dressing will be removed at your follow-up appointment. Contact your Surgeon Team if you have increased redness, warmth around the surgical wound, and/or drainage from the surgical wound.     Dressing / Wound Care - NO Tub Bathing    Tub bathing, swimming, or any other activities that will cause your incision to be submerged in water should be avoided until allowed by your Surgeon.     NO Precautions    No precautions directed by your Provider.  You may perform range of motion activities as tolerated.     Weight bearing as tolerated    Weight bearing as tolerated on your operative extremity.     Dressing Wound Care - Shower with wound/dressing NOT covered    You do not need to cover your dressing or incision in the shower, you may allow water and soap to run over top of the surgical dressing or incision. You may shower 2 days after surgery.  You are strictly prohibited from soaking or submerging the  surgical wound underwater.     Crutches DME    DME Documentation: Describe the reason for need to support medical necessity: Impaired gait status post knee surgery. I, the undersigned, certify that the above prescribed supplies are medically necessary for this patient and is both reasonable and necessary in reference to accepted standards of medical practice in the treatment of this patient's condition and is not prescribed as a convenience.     Cane DME    DME Documentation: Describe the reason for need to support medical necessity: Impaired gait status post knee surgery. I, the undersigned, certify that the above prescribed supplies are medically necessary for this patient and is both reasonable and necessary in reference to accepted standards of medical practice in the treatment of this patient's condition and is not prescribed as a convenience.     Walker DME    DME Documentation: Describe the reason for need to support medical necessity: Impaired gait status post knee surgery. I, the undersigned, certify that the above prescribed supplies are medically necessary for this patient and is both reasonable and necessary in reference to accepted standards of medical practice in the treatment of this patient's condition and is not prescribed as a convenience.     Discharge Instruction - Regular Diet Adult    Return to your pre-surgery diet unless instructed otherwise       Discharge Medications     Current Discharge Medication List      START taking these medications    Details   acetaminophen (TYLENOL) 325 MG tablet Take 2 tablets (650 mg) by mouth every 4 hours as needed for other (mild pain)  Qty: 100 tablet, Refills: 0    Associated Diagnoses: Status post total knee replacement, right      aspirin 81 MG EC tablet Take 1 tablet (81 mg) by mouth 2 times daily  Qty: 60 tablet, Refills: 0    Associated Diagnoses: Status post total knee replacement, right      oxyCODONE (ROXICODONE) 5 MG tablet Take 1-2 tablets (5-10  mg) by mouth every 4 hours as needed for moderate to severe pain (Moderate to Severe)  Qty: 30 tablet, Refills: 0    Associated Diagnoses: Status post total knee replacement, right      polyethylene glycol (MIRALAX) 17 g packet Take 17 g by mouth daily  Qty: 7 packet, Refills: 0    Associated Diagnoses: Status post total knee replacement, right      senna-docusate (SENOKOT-S/PERICOLACE) 8.6-50 MG tablet Take 1-2 tablets by mouth 2 times daily Take while on oral narcotics to prevent or treat constipation.  Qty: 30 tablet, Refills: 0    Comments: While taking narcotics  Associated Diagnoses: Status post total knee replacement, right         CONTINUE these medications which have NOT CHANGED    Details   anastrozole (ARIMIDEX) 1 MG tablet Take 1 mg by mouth every morning       atorvastatin (LIPITOR) 40 MG tablet Take 40 mg by mouth At Bedtime      Cholecalciferol (VITAMIN D3) 2000 UNIT CAPS Take 50 mcg by mouth daily       co-enzyme Q-10 100 MG CAPS capsule Take 100 mg by mouth daily      Cyanocobalamin (B-12) 1000 MCG TBCR Take 1,000 mcg by mouth daily      DULoxetine (CYMBALTA) 30 MG capsule Take 60 mg by mouth daily (2 X 30MG)      hydrochlorothiazide (HYDRODIURIL) 25 MG tablet Take 25 mg by mouth daily      levothyroxine (TIROSINT) 150 MCG capsule Take 150 mcg by mouth daily       losartan (COZAAR) 50 MG tablet Take 50 mg by mouth daily      melatonin 5 MG CAPS Take 5 mg by mouth every evening       metFORMIN (GLUCOPHAGE) 500 MG tablet Take 1,000 mg by mouth 2 times daily (with meals) (2 x 500mg)      omeprazole (PRILOSEC) 20 MG DR capsule Take 20 mg by mouth daily as needed         STOP taking these medications       HYDROcodone-acetaminophen (NORCO) 5-325 MG tablet Comments:   Reason for Stopping:                 After Discharge Recommendations:  1. Resume pre-admission diet  2. DVT prophylaxis: anticoagulants: aspirin  3. Follow up: She should see Dr. Guillermo in clinic in 2 wks.  4. Sutures or staples will be  removed by orthopedic surgeon at the 10-14 day follow-up appointment.  5. Weight Bearing weightbearing: WBAT (Weight bearing as tolerated).  6. Additional followup: None  7. Special instructions: None    Total time spent for discharge on date of discharge: 25 minutes    Physician(s) in addition to primary physician who should receive a copy:  Primary Care Physician Cherry Grace  Orthopedic Medicine and Surgery -[phone]    Zulema Burrows PA-C ....................  5/11/2021   10:08 AM

## 2021-05-11 NOTE — PLAN OF CARE
Occupational Therapy Discharge Summary    Reason for therapy discharge:    Discharged to home.    Progress towards therapy goal(s). See goals on Care Plan in [facility] electronic health record for goal details.  Goals met    Therapy recommendation(s):    No further therapy is recommended.No further skilled OT intervention recommended;noted pt to have Home PT.

## 2022-11-08 ENCOUNTER — APPOINTMENT (OUTPATIENT)
Dept: URBAN - METROPOLITAN AREA CLINIC 256 | Age: 72
Setting detail: DERMATOLOGY
End: 2022-11-08

## 2022-11-08 VITALS — HEIGHT: 66 IN | WEIGHT: 250 LBS

## 2022-11-08 DIAGNOSIS — D22 MELANOCYTIC NEVI: ICD-10-CM

## 2022-11-08 DIAGNOSIS — D18.0 HEMANGIOMA: ICD-10-CM

## 2022-11-08 DIAGNOSIS — L57.0 ACTINIC KERATOSIS: ICD-10-CM

## 2022-11-08 DIAGNOSIS — L57.8 OTHER SKIN CHANGES DUE TO CHRONIC EXPOSURE TO NONIONIZING RADIATION: ICD-10-CM

## 2022-11-08 DIAGNOSIS — L82.1 OTHER SEBORRHEIC KERATOSIS: ICD-10-CM

## 2022-11-08 DIAGNOSIS — Z71.89 OTHER SPECIFIED COUNSELING: ICD-10-CM

## 2022-11-08 PROBLEM — D18.01 HEMANGIOMA OF SKIN AND SUBCUTANEOUS TISSUE: Status: ACTIVE | Noted: 2022-11-08

## 2022-11-08 PROBLEM — D22.5 MELANOCYTIC NEVI OF TRUNK: Status: ACTIVE | Noted: 2022-11-08

## 2022-11-08 PROCEDURE — OTHER MIPS QUALITY: OTHER

## 2022-11-08 PROCEDURE — 17000 DESTRUCT PREMALG LESION: CPT

## 2022-11-08 PROCEDURE — 99213 OFFICE O/P EST LOW 20 MIN: CPT | Mod: 25

## 2022-11-08 PROCEDURE — OTHER COUNSELING: OTHER

## 2022-11-08 PROCEDURE — OTHER LIQUID NITROGEN: OTHER

## 2022-11-08 PROCEDURE — 17003 DESTRUCT PREMALG LES 2-14: CPT

## 2022-11-08 ASSESSMENT — LOCATION DETAILED DESCRIPTION DERM
LOCATION DETAILED: RIGHT SUPERIOR LATERAL BUCCAL CHEEK
LOCATION DETAILED: LEFT MEDIAL MALAR CHEEK
LOCATION DETAILED: LEFT INFERIOR UPPER BACK
LOCATION DETAILED: LEFT MEDIAL UPPER BACK
LOCATION DETAILED: RIGHT INFERIOR LATERAL FOREHEAD
LOCATION DETAILED: LEFT INFERIOR MEDIAL FOREHEAD
LOCATION DETAILED: LEFT LATERAL BUCCAL CHEEK
LOCATION DETAILED: LEFT SUPERIOR MEDIAL MIDBACK
LOCATION DETAILED: RIGHT MEDIAL MALAR CHEEK
LOCATION DETAILED: RIGHT SUPERIOR MEDIAL BUCCAL CHEEK
LOCATION DETAILED: RIGHT INFERIOR MEDIAL FOREHEAD
LOCATION DETAILED: LEFT SUPERIOR LATERAL LOWER BACK
LOCATION DETAILED: INFERIOR MID FOREHEAD
LOCATION DETAILED: LEFT SUPERIOR UPPER BACK

## 2022-11-08 ASSESSMENT — LOCATION ZONE DERM
LOCATION ZONE: TRUNK
LOCATION ZONE: FACE

## 2022-11-08 ASSESSMENT — LOCATION SIMPLE DESCRIPTION DERM
LOCATION SIMPLE: LEFT FOREHEAD
LOCATION SIMPLE: LEFT UPPER BACK
LOCATION SIMPLE: INFERIOR FOREHEAD
LOCATION SIMPLE: LEFT LOWER BACK
LOCATION SIMPLE: RIGHT FOREHEAD
LOCATION SIMPLE: RIGHT CHEEK
LOCATION SIMPLE: LEFT CHEEK

## 2022-11-08 NOTE — PROCEDURE: LIQUID NITROGEN
Post-Care Instructions: I reviewed with the patient in detail post-care instructions. Patient is to wear sunprotection, and avoid picking at any of the treated lesions. Pt may apply Vaseline to crusted or scabbing areas.
Render Post-Care Instructions In Note?: no
Show Aperture Variable?: Yes
Detail Level: Simple
Duration Of Freeze Thaw-Cycle (Seconds): 0
Consent: The patient's consent was obtained including but not limited to risks of crusting, scabbing, blistering, scarring, darker or lighter pigmentary change, recurrence, incomplete removal and infection.

## 2023-12-08 ENCOUNTER — TELEPHONE (OUTPATIENT)
Dept: NEPHROLOGY | Facility: CLINIC | Age: 73
End: 2023-12-08
Payer: MEDICARE

## 2023-12-08 DIAGNOSIS — N18.30 CKD (CHRONIC KIDNEY DISEASE) STAGE 3, GFR 30-59 ML/MIN (H): Primary | ICD-10-CM

## 2023-12-08 NOTE — TELEPHONE ENCOUNTER
[facility] Call Center    Phone Message    May a detailed message be left on voicemail: no     Reason for Call: Order(s): Other:   Reason for requested: Lab orders  Date needed: 06/24/24  Provider name: Dr Mathew    Action Taken: Other: [facility]    Travel Screening: Not Applicable

## 2024-06-18 ENCOUNTER — TELEPHONE (OUTPATIENT)
Dept: NEPHROLOGY | Facility: CLINIC | Age: 74
End: 2024-06-18
Payer: MEDICARE

## 2024-06-18 NOTE — TELEPHONE ENCOUNTER
Attempted to contact pt.  No answer.  Message left on cell VM to return call.      Calling to discuss: Question if pt is keeping scheduled In clinic appt with Dr. Perez 6/24/24 at 8:30am.  Chart reviewed.  Pt was seen by [facility] at [facility] 1/17/24 Alesia Guajardo NP.    Shelby Gaines LPN

## 2024-06-18 NOTE — TELEPHONE ENCOUNTER
"2nd attempt to contact pt.  Not available-  \"Hayden\" answered.  Message left for pt to return call.    Shelby Gaines LPN    "

## 2024-06-18 NOTE — TELEPHONE ENCOUNTER
Pt returned call.  Stated she does want to keep this appt with Dr. Perez.  Confirmed appt date and time.  Gave clinic location with address and building phone number [phone].    Pt had no further questions.    Shelby Gaines LPN

## 2024-06-24 ENCOUNTER — OFFICE VISIT (OUTPATIENT)
Dept: NEPHROLOGY | Facility: CLINIC | Age: 74
End: 2024-06-24
Payer: MEDICARE

## 2024-06-24 ENCOUNTER — LAB (OUTPATIENT)
Dept: LAB | Facility: CLINIC | Age: 74
End: 2024-06-24
Payer: MEDICARE

## 2024-06-24 VITALS
BODY MASS INDEX: 42.37 KG/M2 | SYSTOLIC BLOOD PRESSURE: 148 MMHG | OXYGEN SATURATION: 95 % | HEART RATE: 80 BPM | DIASTOLIC BLOOD PRESSURE: 77 MMHG | WEIGHT: 254.6 LBS

## 2024-06-24 DIAGNOSIS — N18.32 STAGE 3B CHRONIC KIDNEY DISEASE (H): ICD-10-CM

## 2024-06-24 DIAGNOSIS — R32 URINARY INCONTINENCE, UNSPECIFIED TYPE: Primary | ICD-10-CM

## 2024-06-24 DIAGNOSIS — N18.30 CKD (CHRONIC KIDNEY DISEASE) STAGE 3, GFR 30-59 ML/MIN (H): ICD-10-CM

## 2024-06-24 LAB
ALBUMIN MFR UR ELPH: 10 MG/DL
ALBUMIN SERPL BCG-MCNC: 4.8 G/DL (ref 3.5–5.2)
ALBUMIN UR-MCNC: NEGATIVE MG/DL
AMORPH CRY #/AREA URNS HPF: ABNORMAL /HPF
ANION GAP SERPL CALCULATED.3IONS-SCNC: 12 MMOL/L (ref 7–15)
APPEARANCE UR: ABNORMAL
BACTERIA #/AREA URNS HPF: ABNORMAL /HPF
BILIRUB UR QL STRIP: NEGATIVE
BUN SERPL-MCNC: 30.9 MG/DL (ref 8–23)
CALCIUM SERPL-MCNC: 10.1 MG/DL (ref 8.8–10.2)
CHLORIDE SERPL-SCNC: 102 MMOL/L (ref 98–107)
CK SERPL-CCNC: 48 U/L (ref 26–192)
COLOR UR AUTO: YELLOW
CREAT SERPL-MCNC: 1.68 MG/DL (ref 0.51–0.95)
CREAT UR-MCNC: 107 MG/DL
DEPRECATED HCO3 PLAS-SCNC: 25 MMOL/L (ref 22–29)
EGFRCR SERPLBLD CKD-EPI 2021: 32 ML/MIN/1.73M2
ERYTHROCYTE [DISTWIDTH] IN BLOOD BY AUTOMATED COUNT: 14.2 % (ref 10–15)
GLUCOSE SERPL-MCNC: 137 MG/DL (ref 70–99)
GLUCOSE UR STRIP-MCNC: NEGATIVE MG/DL
HCT VFR BLD AUTO: 44.6 % (ref 35–47)
HGB BLD-MCNC: 14.1 G/DL (ref 11.7–15.7)
HGB UR QL STRIP: ABNORMAL
KETONES UR STRIP-MCNC: NEGATIVE MG/DL
LEUKOCYTE ESTERASE UR QL STRIP: NEGATIVE
MCH RBC QN AUTO: 28.8 PG (ref 26.5–33)
MCHC RBC AUTO-ENTMCNC: 31.6 G/DL (ref 31.5–36.5)
MCV RBC AUTO: 91 FL (ref 78–100)
NITRATE UR QL: NEGATIVE
PH UR STRIP: 5.5 [PH] (ref 5–7)
PHOSPHATE SERPL-MCNC: 4.1 MG/DL (ref 2.5–4.5)
PLATELET # BLD AUTO: 298 10E3/UL (ref 150–450)
POTASSIUM SERPL-SCNC: 4.5 MMOL/L (ref 3.4–5.3)
PROT/CREAT 24H UR: 0.09 MG/MG CR (ref 0–0.2)
PTH-INTACT SERPL-MCNC: 65 PG/ML (ref 15–65)
RBC # BLD AUTO: 4.89 10E6/UL (ref 3.8–5.2)
RBC #/AREA URNS AUTO: ABNORMAL /HPF
SKIP: ABNORMAL
SODIUM SERPL-SCNC: 139 MMOL/L (ref 135–145)
SP GR UR STRIP: 1.01 (ref 1–1.03)
SQUAMOUS #/AREA URNS AUTO: ABNORMAL /LPF
UROBILINOGEN UR STRIP-MCNC: NORMAL MG/DL
VIT D+METAB SERPL-MCNC: 43 NG/ML (ref 20–50)
WBC # BLD AUTO: 9.4 10E3/UL (ref 4–11)
WBC #/AREA URNS AUTO: ABNORMAL /HPF

## 2024-06-24 PROCEDURE — 99204 OFFICE O/P NEW MOD 45 MIN: CPT | Performed by: INTERNAL MEDICINE

## 2024-06-24 PROCEDURE — 82550 ASSAY OF CK (CPK): CPT

## 2024-06-24 PROCEDURE — 81001 URINALYSIS AUTO W/SCOPE: CPT

## 2024-06-24 PROCEDURE — 82306 VITAMIN D 25 HYDROXY: CPT

## 2024-06-24 PROCEDURE — 36415 COLL VENOUS BLD VENIPUNCTURE: CPT

## 2024-06-24 PROCEDURE — 85027 COMPLETE CBC AUTOMATED: CPT

## 2024-06-24 PROCEDURE — 83970 ASSAY OF PARATHORMONE: CPT

## 2024-06-24 PROCEDURE — 80069 RENAL FUNCTION PANEL: CPT

## 2024-06-24 PROCEDURE — 84156 ASSAY OF PROTEIN URINE: CPT

## 2024-06-24 RX ORDER — TIRZEPATIDE 10 MG/.5ML
10 INJECTION, SOLUTION SUBCUTANEOUS
COMMUNITY
Start: 2024-05-15 | End: 2024-06-24

## 2024-06-24 RX ORDER — PREGABALIN 50 MG/1
CAPSULE ORAL
COMMUNITY
Start: 2024-03-19

## 2024-06-24 RX ORDER — SEMAGLUTIDE 2.68 MG/ML
INJECTION, SOLUTION SUBCUTANEOUS
COMMUNITY
Start: 2024-05-20

## 2024-06-24 RX ORDER — TIZANIDINE 2 MG/1
TABLET ORAL
COMMUNITY
Start: 2023-06-26

## 2024-06-24 NOTE — PATIENT INSTRUCTIONS
Goal sodium intake of 2000 mg per day  Recheck BP before leaving today  Monitor BP at home - goal is 110-130 ideally. Do this for a week and write them down. Mychart these readings in a week.   If difficulty with heart burn, recommend taking famotidine as needed (pepcid).   Urology referral - Dr. Quintanilla specifically.   Tentative plan to follow-up in 6 months.

## 2024-06-24 NOTE — PROGRESS NOTES
06/24/24    Assessment/Plan:   CKD Stage 3: in the setting of longstanding hypertension/diabetes/previous KRISTYN. Also at risk for kidney disease in the setting of obesity and hx of nephrolithiasis. Recommend good diabetes and blood pressure control, avoidance of NSAIDs, weight optimization, and good hydration. Calcium is at the upper limit of normal - want to avoid hypercalcemia  Hypertension: asked her to monitor sodium intake and minimize as able. Asked her to monitor pressures at home and update if above goal.   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 Encourage her to trial famotidine as needed for GERD.   MARLYN: not on CPAP as could not tolerate  Obesity: BMI 42 - encourage weight loss as she is at risk for obesity related glomerulopathy/FSGS changes. ON ozempic.     Patient Instructions   Goal sodium intake of 2000 mg per day  Recheck BP before leaving today  Monitor BP at home - goal is 110-130 ideally. Do this for a week and write them down. Mychart these readings in a week.   If difficulty with heart burn, recommend taking famotidine as needed (pepcid).   Urology referral - Dr. Quintanilla specifically.   Tentative plan to follow-up in 6 months.        Monie Perez, DO    6/24/24  This patient was self referred for evaluation of CKD.     CC: CKD    HPI: Mai Dutton is a 74 year old female who presents for evaluation of CKD. Ms. Dutton was previously seen by nephrology in [facility] but presents today to establish in our system. ON review of her creatinine levels, they have been 0.8-1 from [id number], 1.1-1.3 from [id number]. She had an KRISTYN in April 2023 - creatinine peaked at 2.41 during a left obstructing stone and UTI - ureteral stent placed. Creatinine since this KRISTYN has been 1.6-1.9. Ultrasound in [facility] 2023 was normal other than a simple cyst noted on the left; possible  nonobstructing stone on left. She was seen by urology in Dec 2023 and no intervention was warranted. IN regards to risk factors for kidney disease, she has diabetes - dx in her 60s - she denies any difficulty with retinopathy. Addt hx includes hypertension - dx in her 60s at least. She is on ozempic but denies any success with weight loss. No NSAID use. She was previously living on aleve per her report - stopped 1-2 years ago. She denies any gross hematuria but reports dark urine at times. She has lymphedema. Uses tums PRN - last dose last PM - uses typically a couple of times per week. She has difficulty with orthopedic issues/back pain - difficulty with spinal stenosis. She has plans to have left knee replaced - rpeviously with right knee replacement. No family hx of kidney disease. No hx of pregnancy/preeclampsia. She is a retired RN.     Current Outpatient Medications   Medication Sig Dispense Refill    acetaminophen (TYLENOL) 325 MG tablet Take 2 tablets (650 mg) by mouth every 4 hours as needed for other (mild pain) 100 tablet 0    anastrozole (ARIMIDEX) 1 MG tablet Take 1 mg by mouth every morning       aspirin 81 MG EC tablet Take 1 tablet (81 mg) by mouth 2 times daily 60 tablet 0    atorvastatin (LIPITOR) 40 MG tablet Take 40 mg by mouth At Bedtime      Cholecalciferol (VITAMIN D3) 2000 UNIT CAPS Take 50 mcg by mouth daily       co-enzyme Q-10 100 MG CAPS capsule Take 100 mg by mouth daily      Cyanocobalamin (B-12) 1000 MCG TBCR Take 1,000 mcg by mouth daily      DULoxetine (CYMBALTA) 30 MG capsule Take 60 mg by mouth daily (2 X 30MG)      hydrochlorothiazide (HYDRODIURIL) 25 MG tablet Take 25 mg by mouth daily      levothyroxine (TIROSINT) 150 MCG capsule Take 150 mcg by mouth daily       losartan (COZAAR) 50 MG tablet Take 50 mg by mouth daily      melatonin 5 MG CAPS Take 5 mg by mouth every evening       metFORMIN (GLUCOPHAGE) 500 MG tablet Take 1,000 mg by mouth 2 times daily (with meals) (2 x 500mg)       omeprazole (PRILOSEC) 20 MG DR capsule Take 20 mg by mouth daily as needed      oxyCODONE (ROXICODONE) 5 MG tablet Take 1-2 tablets (5-10 mg) by mouth every 4 hours as needed for moderate to severe pain (Moderate to Severe) 30 tablet 0    polyethylene glycol (MIRALAX) 17 g packet Take 17 g by mouth daily 7 packet 0    senna-docusate (SENOKOT-S/PERICOLACE) 8.6-50 MG tablet Take 1-2 tablets by mouth 2 times daily Take while on oral narcotics to prevent or treat constipation. 30 tablet 0     No current facility-administered medications for this visit.       Exam:  There were no vitals taken for this visit.  GENERAL APPEARANCE: alert and no distress  CV - RRR  R - CTAB  Ext - no pitting edema; lymphedema present    Results:    Lab on 06/24/2024   Component Date Value Ref Range Status    WBC Count 06/24/2024 9.4  4.0 - 11.0 10e3/uL Final    RBC Count 06/24/2024 4.89  3.80 - 5.20 10e6/uL Final    Hemoglobin 06/24/2024 14.1  11.7 - 15.7 g/dL Final    Hematocrit 06/24/2024 44.6  35.0 - 47.0 % Final    MCV 06/24/2024 91  78 - 100 fL Final    MCH 06/24/2024 28.8  26.5 - 33.0 pg Final    MCHC 06/24/2024 31.6  31.5 - 36.5 g/dL Final    RDW 06/24/2024 14.2  10.0 - 15.0 % Final    Platelet Count 06/24/2024 298  150 - 450 10e3/uL Final    Parathyroid Hormone Intact 06/24/2024 65  15 - 65 pg/mL Final    Total Protein Urine mg/dL 06/24/2024 10.0    mg/dL Final    The reference ranges have not been established in urine protein. The results should be integrated into the clinical context for interpretation.    Total Protein Urine mg/mg Creat 06/24/2024 0.09  0.00 - 0.20 mg/mg Cr Final    Creatinine Urine mg/dL 06/24/2024 107.0  mg/dL Final    The reference ranges have not been established in urine creatinine. The results should be integrated into the clinical context for interpretation.    Sodium 06/24/2024 139  135 - 145 mmol/L Final    Reference intervals for this test were updated on 09/26/2023 to more accurately reflect our  healthy population. There may be differences in the flagging of prior results with similar values performed with this method. Interpretation of those prior results can be made in the context of the updated reference intervals.     Potassium 06/24/2024 4.5  3.4 - 5.3 mmol/L Final    Chloride 06/24/2024 102  98 - 107 mmol/L Final    Carbon Dioxide (CO2) 06/24/2024 25  22 - 29 mmol/L Final    Anion Gap 06/24/2024 12  7 - 15 mmol/L Final    Glucose 06/24/2024 137 (H)  70 - 99 mg/dL Final    Urea Nitrogen 06/24/2024 30.9 (H)  8.0 - 23.0 mg/dL Final    Creatinine 06/24/2024 1.68 (H)  0.51 - 0.95 mg/dL Final    GFR Estimate 06/24/2024 32 (L)  >60 mL/min/1.73m2 Final    eGFR calculated using 2021 CKD-EPI equation.    Calcium 06/24/2024 10.1  8.8 - 10.2 mg/dL Final    Albumin 06/24/2024 4.8  3.5 - 5.2 g/dL Final    Phosphorus 06/24/2024 4.1  2.5 - 4.5 mg/dL Final    Color Urine 06/24/2024 Yellow  Colorless, Straw, Light Yellow, Yellow Final    Appearance Urine 06/24/2024 Slightly Cloudy (A)  Clear Final    Glucose Urine 06/24/2024 Negative  Negative mg/dL Final    Bilirubin Urine 06/24/2024 Negative  Negative Final    Ketones Urine 06/24/2024 Negative  Negative mg/dL Final    Specific Gravity Urine 06/24/2024 1.015  0.999 - 1.035 Final    Blood Urine 06/24/2024 Trace (A)  Negative Final    pH Urine 06/24/2024 5.5  5.0 - 7.0 Final    Protein Albumin Urine 06/24/2024 Negative  Negative mg/dL Final    Urobilinogen Urine 06/24/2024 Normal  Normal, 2.0 mg/dL Final    Nitrite Urine 06/24/2024 Negative  Negative Final    Leukocyte Esterase Urine 06/24/2024 Negative  Negative Final    Vitamin D, Total (25-Hydroxy) 06/24/2024 43  20 - 50 ng/mL Final    optimum levels    CK 06/24/2024 48  26 - 192 U/L Final    Bacteria Urine 06/24/2024 Moderate (A)  None Seen /HPF Final    RBC Urine 06/24/2024 25-50 (A)  0-2 /HPF /HPF Final    WBC Urine 06/24/2024 0-5  0-5 /HPF /HPF Final    Squamous Epithelials Urine 06/24/2024 Many (A)  None Seen  /LPF Final    Amorphous Crystals Urine 06/24/2024 Many (A)  None Seen /HPF Final      Lab results were reviewed and interpreted.         Monie Perez DO

## 2024-06-24 NOTE — NURSING NOTE
Mai Dutton's goals for this visit include:   Chief Complaint   Patient presents with    Consult     Referred by Dr. Grace        She requests these members of her care team be copied on today's visit information: yes     PCP: Cherry Grace    Referring Provider:  Cherry Grace MD  [address]  [address]  [place] [id number]    BP (!) 142/72 (BP Location: Left arm, Patient Position: Chair, Cuff Size: Adult Large)   Pulse 82   Wt 115.5 kg (254 lb 9.6 oz)   SpO2 95%   BMI 42.37 kg/m      Do you need any medication refills at today's visit? No       NEREIDA Renteria   Neph/Pulm Teams  [facility]

## 2024-06-27 DIAGNOSIS — R31.29 MICROSCOPIC HEMATURIA: Primary | ICD-10-CM

## 2024-07-05 ENCOUNTER — TELEPHONE (OUTPATIENT)
Dept: NEPHROLOGY | Facility: CLINIC | Age: 74
End: 2024-07-05
Payer: COMMERCIAL

## 2024-07-05 NOTE — TELEPHONE ENCOUNTER
Attempted to contact pt.  No answer.  Message left on cell VM to log on to her [employer] to read message from Dr. Perez with recommendations.  Encouraged to call as well.    Shelby Gaines LPN      [employer] notification pt has not read Dr. Perez's Result Note with recommendations:    Attached are your labs we discussed at your visit. Your urine showed some red blood cells seen - no white blood cells present. Creatinine was similar to your baseline since April 2023. Vitamin d level is at goal. Given your calcium level is at the upper limit of normal, I recommend that we adjust your vitamin D supplement given you are at goal. You can either hold the vitamin D supplement and we will monitor the level going forward or if you want to continue on a maintenance dose, recommend taking only 1000 units daily. No protein in the urine which is reassuring.     Summary of recommendations:  1. Decrease vitamin D - can either hold altogether for now or if you want to take maintenance dose, decrease to just 1000 units daily  2. Because of the blood in the urine, I recommend repeat Urine testing again in the coming weeks. If blood is again noted in the urine, we will need to get urology's input in regards to the source of the blood.     Please call or [employer] with questions or concerns.     Sincerely,  Monie Perez DO   Written by Monie Perez DO on 6/27/2024 11:19 PM CD    Component      Latest Ref Rng 6/24/2024  8:06 AM   Color Urine      Colorless, Straw, Light Yellow, Yellow  Yellow    Appearance Urine      Clear  Slightly Cloudy !    Glucose Urine      Negative mg/dL Negative    Bilirubin Urine      Negative  Negative    Ketones Urine      Negative mg/dL Negative    Specific Gravity Urine      0.999 - 1.035  1.015    Blood Urine      Negative  Trace !    pH Urine      5.0 - 7.0  5.5    Protein Albumin Urine      Negative mg/dL Negative    Urobilinogen mg/dL      Normal, 2.0 mg/dL Normal    Nitrite Urine       Negative  Negative    Leukocyte Esterase Urine      Negative  Negative    Sodium      135 - 145 mmol/L 139    Potassium      3.4 - 5.3 mmol/L 4.5    Chloride      98 - 107 mmol/L 102    Carbon Dioxide (CO2)      22 - 29 mmol/L 25    Anion Gap      7 - 15 mmol/L 12    Glucose      70 - 99 mg/dL 137 (H)    Urea Nitrogen      8.0 - 23.0 mg/dL 30.9 (H)    Creatinine      0.51 - 0.95 mg/dL 1.68 (H)    GFR Estimate      >60 mL/min/1.73m2 32 (L)    Calcium      8.8 - 10.2 mg/dL 10.1    Albumin      3.5 - 5.2 g/dL 4.8    Phosphorus      2.5 - 4.5 mg/dL 4.1    WBC      4.0 - 11.0 10e3/uL 9.4    RBC Count      3.80 - 5.20 10e6/uL 4.89    Hemoglobin      11.7 - 15.7 g/dL 14.1    Hematocrit      35.0 - 47.0 % 44.6    MCV      78 - 100 fL 91    MCH      26.5 - 33.0 pg 28.8    MCHC      31.5 - 36.5 g/dL 31.6    RDW      10.0 - 15.0 % 14.2    Platelet Count      150 - 450 10e3/uL 298    Bacteria Urine      None Seen /HPF Moderate !    RBC Urine      0-2 /HPF /HPF 25-50 !    WBC Urine      0-5 /HPF /HPF 0-5    Squamous Epithelial /LPF Urine      None Seen /LPF Many !    Amorphous Crystals      None Seen /HPF Many !    Total Protein Urine mg/dL        mg/dL 10.0    Total Protein Urine mg/mg Creat      0.00 - 0.20 mg/mg Cr 0.09    Creatinine Urine      mg/dL 107.0    Parathyroid Hormone Intact      15 - 65 pg/mL 65    Vitamin D, Total (25-Hydroxy)      20 - 50 ng/mL 43    CK Total      26 - 192 U/L 48       Legend:  ! Abnormal  (H) High  (L) Low

## 2024-07-05 NOTE — LETTER
July 16, 2024      Mai Dutton  [address] [address]        Dear Mai,    We have been trying to reach you by phone with no success.  Dr. Perez has sent to you a [employer] message with recommendation on 6/27/24, please see copy.    Attached are your labs we discussed at your visit. Your urine showed some red blood cells seen - no white blood cells present. Creatinine was similar to your baseline since April 2023. Vitamin d level is at goal. Given your calcium level is at the upper limit of normal, I recommend that we adjust your vitamin D supplement given you are at goal. You can either hold the vitamin D supplement and we will monitor the level going forward or if you want to continue on a maintenance dose, recommend taking only 1000 units daily. No protein in the urine which is reassuring.     Summary of recommendations:  1. Decrease vitamin D - can either hold altogether for now or if you want to take maintenance dose, decrease to just 1000 units daily  2. Because of the blood in the urine, I recommend repeat Urine testing again in the coming weeks. If blood is again noted in the urine, we will need to get urology's input in regards to the source of the blood.     Please call or [employer] with questions or concerns.     Sincerely,  Monie Perez DO   Written by Monie Perez,  on 6/27/2024 11:19 PM CD                  Please call or send a [employer] message if you have further questions or concerns.    Best regards,    ALEXUS Ryan  Nephrology [facility]  [facility]/[place], [place]                  Component      Latest Ref Rng 6/24/2024  8:06 AM   Color Urine      Colorless, Straw, Light Yellow, Yellow  Yellow    Appearance Urine      Clear  Slightly Cloudy !    Glucose Urine      Negative mg/dL Negative    Bilirubin Urine      Negative  Negative    Ketones Urine      Negative mg/dL Negative    Specific Gravity Urine      0.999 - 1.035  1.015    Blood Urine       Negative  Trace !    pH Urine      5.0 - 7.0  5.5    Protein Albumin Urine      Negative mg/dL Negative    Urobilinogen mg/dL      Normal, 2.0 mg/dL Normal    Nitrite Urine      Negative  Negative    Leukocyte Esterase Urine      Negative  Negative    Sodium      135 - 145 mmol/L 139    Potassium      3.4 - 5.3 mmol/L 4.5    Chloride      98 - 107 mmol/L 102    Carbon Dioxide (CO2)      22 - 29 mmol/L 25    Anion Gap      7 - 15 mmol/L 12    Glucose      70 - 99 mg/dL 137 (H)    Urea Nitrogen      8.0 - 23.0 mg/dL 30.9 (H)    Creatinine      0.51 - 0.95 mg/dL 1.68 (H)    GFR Estimate      >60 mL/min/1.73m2 32 (L)    Calcium      8.8 - 10.2 mg/dL 10.1    Albumin      3.5 - 5.2 g/dL 4.8    Phosphorus      2.5 - 4.5 mg/dL 4.1    WBC      4.0 - 11.0 10e3/uL 9.4    RBC Count      3.80 - 5.20 10e6/uL 4.89    Hemoglobin      11.7 - 15.7 g/dL 14.1    Hematocrit      35.0 - 47.0 % 44.6    MCV      78 - 100 fL 91    MCH      26.5 - 33.0 pg 28.8    MCHC      31.5 - 36.5 g/dL 31.6    RDW      10.0 - 15.0 % 14.2    Platelet Count      150 - 450 10e3/uL 298    Bacteria Urine      None Seen /HPF Moderate !    RBC Urine      0-2 /HPF /HPF 25-50 !    WBC Urine      0-5 /HPF /HPF 0-5    Squamous Epithelial /LPF Urine      None Seen /LPF Many !    Amorphous Crystals      None Seen /HPF Many !    Total Protein Urine mg/dL        mg/dL 10.0    Total Protein Urine mg/mg Creat      0.00 - 0.20 mg/mg Cr 0.09    Creatinine Urine      mg/dL 107.0    Parathyroid Hormone Intact      15 - 65 pg/mL 65    Vitamin D, Total (25-Hydroxy)      20 - 50 ng/mL 43    CK Total      26 - 192 U/L 48       Legend:  ! Abnormal  (H) High  (L) Low

## 2024-07-10 NOTE — TELEPHONE ENCOUNTER
2nd attempt to contact pt.  No answer. Message left on cell VM regarding Dr. Perez's Result note message sent to her in "[employer].  Encouraged to call back as well.    Shelby Gaines LPN

## 2024-07-15 NOTE — TELEPHONE ENCOUNTER
3rd attempt to contact pt.  No answer on home number.  VM did not give identifier, no message left.  Called Mobile number.  No answer.  Message left to view message from Dr. Perez in [employer], and to return call.    Shelby Gaines LPN

## 2024-07-16 ENCOUNTER — MYC MEDICAL ADVICE (OUTPATIENT)
Dept: NEPHROLOGY | Facility: CLINIC | Age: 74
End: 2024-07-16
Payer: COMMERCIAL

## 2024-07-17 ENCOUNTER — PATIENT OUTREACH (OUTPATIENT)
Dept: NEPHROLOGY | Facility: CLINIC | Age: 74
End: 2024-07-17
Payer: COMMERCIAL

## 2024-07-17 DIAGNOSIS — N18.32 STAGE 3B CHRONIC KIDNEY DISEASE (H): Primary | ICD-10-CM

## 2024-07-17 NOTE — TELEPHONE ENCOUNTER
[employer] message also sent 7/17/24 by Nephrology Care Coordinator.  Shelby Gaines LPN

## 2024-07-17 NOTE — PROGRESS NOTES
Patient enrolled in CKD Journey. GFR 32. She has interest in receiving more CKD education at this time. Referred for Kidney [facility].   Neph Tracking Flowsheet Last Filled Values       Kidney Smart CKD Basics Referral 07/17/24    Patient's Referral Dates Auto Populate Patient's Referral Dates    Journey Referral 7/17/24

## 2024-07-22 ENCOUNTER — LAB (OUTPATIENT)
Dept: LAB | Facility: CLINIC | Age: 74
End: 2024-07-22
Payer: MEDICARE

## 2024-07-22 DIAGNOSIS — R31.29 MICROSCOPIC HEMATURIA: ICD-10-CM

## 2024-07-22 LAB
ALBUMIN UR-MCNC: NEGATIVE MG/DL
APPEARANCE UR: CLEAR
BILIRUB UR QL STRIP: NEGATIVE
COLOR UR AUTO: NORMAL
GLUCOSE UR STRIP-MCNC: NEGATIVE MG/DL
HGB UR QL STRIP: NEGATIVE
KETONES UR STRIP-MCNC: NEGATIVE MG/DL
LEUKOCYTE ESTERASE UR QL STRIP: NEGATIVE
NITRATE UR QL: NEGATIVE
PH UR STRIP: 5.5 [PH] (ref 5–7)
SKIP: NORMAL
SP GR UR STRIP: 1.02 (ref 1–1.03)
UROBILINOGEN UR STRIP-MCNC: NORMAL MG/DL

## 2024-07-22 PROCEDURE — 81003 URINALYSIS AUTO W/O SCOPE: CPT

## 2024-08-10 ENCOUNTER — HEALTH MAINTENANCE LETTER (OUTPATIENT)
Age: 74
End: 2024-08-10

## 2024-08-26 ENCOUNTER — OFFICE VISIT (OUTPATIENT)
Dept: UROLOGY | Facility: CLINIC | Age: 74
End: 2024-08-26
Attending: INTERNAL MEDICINE
Payer: MEDICARE

## 2024-08-26 DIAGNOSIS — N39.41 URGE INCONTINENCE: ICD-10-CM

## 2024-08-26 DIAGNOSIS — R31.29 MICROHEMATURIA: Primary | ICD-10-CM

## 2024-08-26 DIAGNOSIS — R32 URINARY INCONTINENCE, UNSPECIFIED TYPE: ICD-10-CM

## 2024-08-26 DIAGNOSIS — R39.15 URGENCY OF URINATION: ICD-10-CM

## 2024-08-26 PROCEDURE — 99204 OFFICE O/P NEW MOD 45 MIN: CPT | Performed by: UROLOGY

## 2024-08-26 NOTE — NURSING NOTE
Mai Dutton's goals for this visit include:   Chief Complaint   Patient presents with    New Patient     Urinary incontinence, second opinion with Dr. Quintanilla       She requests these members of her care team be copied on today's visit information:       PCP: Cherry Grace    Referring Provider:  Monie Perez DO  [address]  [place] [address]    There were no vitals taken for this visit.    Do you need any medication refills at today's visit?     Vanessa Farrar LPN on 8/26/2024 at 10:18 AM

## 2024-08-26 NOTE — PROGRESS NOTES
HPI:  Mai Dutton is a 74 year old female with microhematuria.  No gross hematuria.  She also has significant urinary urge and unawareness incontinence for which she has tried medication and botox which have not helped.    Reviewed previous notes from Dr. Cherry Grace on 7/2/24    Exam:  There were no vitals taken for this visit.  GENERAL: alert and no distress  EYES: Eyes grossly normal to inspection.  No discharge or erythema, or obvious scleral/conjunctival abnormalities.  RESP: No audible wheeze, cough, or visible cyanosis.    SKIN: Visible skin clear. No significant rash, abnormal pigmentation or lesions.  NEURO: Cranial nerves grossly intact.  Mentation and speech appropriate for age.  PSYCH: Appropriate affect, tone, and pace of words    Review of Imaging:  The following imaging exams were reviewed by me and discussed with patient:  CT abd/pelvis on 4/19/23:  Findings:    Normal caliber thoracic aorta heart size is normal. Basilar atelectasis.   Unenhanced liver gallbladder spleen unremarkable. Small hiatal hernia adrenal glands unremarkable pancreas unremarkable. Urinary bladder unremarkable diverticulosis bowel is unremarkable.   No abdominal aortic aneurysm.   Right kidney unremarkable moderate left hydronephrosis hydroureter with perinephric stranding. Low-attenuation lesion left kidney incompletely assessed there is nonobstructing small stone. There is a 3 millimeters stone left mid to distal ureter.       Review of Labs:  The following labs were reviewed by me and discussed with the patient:  Lab Results   Component Value Date    WBC 9.4 06/24/2024    WBC 11.7 05/11/2021     Lab Results   Component Value Date    RBC 4.89 06/24/2024    RBC 3.55 05/11/2021     Lab Results   Component Value Date    HGB 14.1 06/24/2024    HGB 10.2 05/11/2021     Lab Results   Component Value Date    HCT 44.6 06/24/2024    HCT 32.6 05/11/2021     Lab Results   Component Value Date    MCV 91 06/24/2024    MCV 92  05/11/2021     Lab Results   Component Value Date    MCH 28.8 06/24/2024    MCH 28.7 05/11/2021     Lab Results   Component Value Date    MCHC 31.6 06/24/2024    MCHC 31.3 05/11/2021     Lab Results   Component Value Date    RDW 14.2 06/24/2024    RDW 13.8 05/11/2021     Lab Results   Component Value Date     06/24/2024     05/11/2021        Last Comprehensive Metabolic Panel:  Sodium   Date Value Ref Range Status   06/24/2024 139 135 - 145 mmol/L Final     Comment:     Reference intervals for this test were updated on 09/26/2023 to more accurately reflect our healthy population. There may be differences in the flagging of prior results with similar values performed with this method. Interpretation of those prior results can be made in the context of the updated reference intervals.    05/11/2021 138 133 - 144 mmol/L Final     Potassium   Date Value Ref Range Status   06/24/2024 4.5 3.4 - 5.3 mmol/L Final   05/11/2021 4.3 3.4 - 5.3 mmol/L Final     Chloride   Date Value Ref Range Status   06/24/2024 102 98 - 107 mmol/L Final   05/11/2021 109 94 - 109 mmol/L Final     Carbon Dioxide   Date Value Ref Range Status   05/11/2021 26 20 - 32 mmol/L Final     Carbon Dioxide (CO2)   Date Value Ref Range Status   06/24/2024 25 22 - 29 mmol/L Final     Anion Gap   Date Value Ref Range Status   06/24/2024 12 7 - 15 mmol/L Final   05/11/2021 3 3 - 14 mmol/L Final     Glucose   Date Value Ref Range Status   06/24/2024 137 (H) 70 - 99 mg/dL Final   05/11/2021 129 (H) 70 - 99 mg/dL Final     Urea Nitrogen   Date Value Ref Range Status   06/24/2024 30.9 (H) 8.0 - 23.0 mg/dL Final   05/11/2021 34 (H) 7 - 30 mg/dL Final     Creatinine   Date Value Ref Range Status   06/24/2024 1.68 (H) 0.51 - 0.95 mg/dL Final   05/11/2021 1.25 (H) 0.52 - 1.04 mg/dL Final     GFR Estimate   Date Value Ref Range Status   06/24/2024 32 (L) >60 mL/min/1.73m2 Final     Comment:     eGFR calculated using 2021 CKD-EPI equation.   05/11/2021 44  (L) >60 mL/min/[1.73_m2] Final     Comment:     Non  GFR Calc  Starting 12/18/2018, serum creatinine based estimated GFR (eGFR) will be   calculated using the Chronic Kidney Disease Epidemiology Collaboration   (CKD-EPI) equation.       Calcium   Date Value Ref Range Status   06/24/2024 10.1 8.8 - 10.2 mg/dL Final   05/11/2021 8.1 (L) 8.5 - 10.1 mg/dL Final     Bilirubin Total   Date Value Ref Range Status   05/11/2021 0.3 0.2 - 1.3 mg/dL Final     Alkaline Phosphatase   Date Value Ref Range Status   05/11/2021 143 40 - 150 U/L Final     ALT   Date Value Ref Range Status   05/11/2021 59 (H) 0 - 50 U/L Final     AST   Date Value Ref Range Status   05/11/2021 42 0 - 45 U/L Final                Color Urine (no units)   Date Value   07/22/2024 Light Yellow     Appearance Urine (no units)   Date Value   07/22/2024 Clear     Glucose Urine (mg/dL)   Date Value   07/22/2024 Negative     Bilirubin Urine (no units)   Date Value   07/22/2024 Negative     Ketones Urine (mg/dL)   Date Value   07/22/2024 Negative     Specific Gravity Urine (no units)   Date Value   07/22/2024 1.019     pH Urine (no units)   Date Value   07/22/2024 5.5     Protein Albumin Urine (mg/dL)   Date Value   07/22/2024 Negative     Nitrite Urine (no units)   Date Value   07/22/2024 Negative     Leukocyte Esterase Urine (no units)   Date Value   07/22/2024 Negative      6/24/24    Bacteria Urine  None Seen /HPF Moderate Abnormal     RBC Urine  0-2 /HPF /HPF 25-50 Abnormal     WBC Urine  0-5 /HPF /HPF 0-5    Squamous Epithelials Urine  None Seen /LPF Many Abnormal     Amorphous Crystals Urine  None Seen /HPF Many Abnormal        Assessment & Plan   75 y/o female with microscopic hematuria.  We discussed a hematuria w/u and she agrees with this plan.    She also complains of significant urge incontinence and has tried medication as well as botox which did not help her.  We discussed the option of SNS and she is interested in this.  I offered  her options of [employer] and [employer] and informed the patient that I am a consultant of both companies and answered her questions.  -schedule CT urogram  -send urine for cytology today  -f/u to review CT and undergo cystoscopy.  If all is normal then consider RANDYE    Panda Quintanilla MD  [facility]

## 2024-08-26 NOTE — PATIENT INSTRUCTIONS
-schedule CT urogram  -send urine for cytology today  -f/u to review CT and undergo cystoscopy.  If all is normal then consider PNE    Cystoscopy    What is a Cystoscopy?  This is a procedure done to check for problems inside the bladder. Problems may include polyps (growths), tumors, inflammation (swelling and redness) and other concerns.    The doctor inserts a thin tube (called a cystoscope) into the bladder. The tube is about the size of a pencil. We will clean the area with special soap to remove bacteria and prevent post-procedure infection. We will give you numbing medicine (Lidocaine jelly) to reduce the pain or discomfort you may feel.    The tube allows the doctor to:  The doctor will be able to see inside the bladder by filling the bladder with water. The water makes it easier to see any problems that may be present.    If needed, the doctor may use the tube to:  The doctor is able to take tissue samples (biopsies). Samples are sent to the lab for testing.  The doctor can also burn off any small growths or tumors that are found. This is call fulguration.    How should I get ready for the exam?  There is no special preparation you need to do for this exam. Staff may ask for a urine sample prior to rooming you. You may eat and drink a normal diet before and after the exam. Medications may be taken as usual unless otherwise directed by the physician.    Please tell your doctor if:  You have a history of urinary tract infections.  You know that you have a tumor in your bladder.  You have bleeding problems.  You have any allergies.  You are or may be pregnant.    What happens after the exam?  You may go back to your normal diet and activity as you feel ready, unless your doctor tells you not to.    For the next two days, you may notice:  Some blood in your urine.  Some burning when you urinate (use the toilet).  An urge to urinate more often.  Bladder spasms.    These are normal after the procedure. They  should go away on their own after a day or two.      You can help to relieve the above listed symptoms by:  Drinking 6 to 8 large glasses of water each day (includes drinks at meals).  This will help clear the urine.  Take warm baths to relieve pain and bladder spasms.  Do not add anything to the bath water.  Your doctor may prescribe pain medicine.  You may also take Tylenol (acetaminophen) for pain.    When should I call my doctor?  A fever over 100.0 F (38 C) for more than a day.  (Before you call the doctor, check your temperature under your tongue.)  Chills.  Failure to urinate: No urine comes out when you try to use the toilet.  (Try soaking in a bathtub full of warm water.  If still no urine, call your doctor.)  A lot of blood in the urine or blood clots larger than a nickel.  Pain in the back or abdomen (belly / stomach area).  Pain or spasms that are not relieved by warm tub baths and pain medicine.  Severe pain, burning or other problems while passing urine.  Pain that gets worse after two days.

## 2024-09-04 ENCOUNTER — TELEPHONE (OUTPATIENT)
Dept: UROLOGY | Facility: CLINIC | Age: 74
End: 2024-09-04
Payer: MEDICARE

## 2024-09-04 DIAGNOSIS — R31.29 MICROHEMATURIA: Primary | ICD-10-CM

## 2024-09-04 NOTE — TELEPHONE ENCOUNTER
Future UA/UC and urine cytology ordered per protocol in preparation for 10/28/24 cystoscopy with Dr. Quintanilla.     Natacha Masters RN, BSN

## 2024-09-04 NOTE — TELEPHONE ENCOUNTER
Patient is scheduled for an upcoming appointment with   on 10/28/24.     Per last note patient was to: follow up to review CT and undergo cystoscopy, if all is normal then consider PNE    Orders placed: UA/UC    Patient is scheduled 10/28/24 to complete UA/UC lab/imaging.   Patient is scheduled 10/23/24 to complete CT Abdomen Pelvis imaging.    Routing to the inbasket to ensure patient completes lab/imaging    Ester Santana MA on 9/4/2024 at 2:25 PM      Future Appointments 9/4/2024 - 3/3/2025        Date Visit Type Length Department Provider     10/23/2024 10:40 AM CT ABDOMEN PELVIS WWO 20 min MG CT SCAN MGCT1    Location Instructions:     [address] [place] [address]  Parking Imaging customers can park in the lots on either side of the driveway leading to the West Entrance of the building.  Entrance and check-in location Enter through the West Entrance doors. Proceed straight ahead, through the lobby, to Check-In B (adjacent to the [facility]). Please check-in at with the registration staff at the desk labeled Check-In B.              10/28/2024 10:15 AM LAB 15 min MG LABORATORY LAB FIRST FLOOR [facility]    Location Instructions:     The clinic is located at [address] in [place].&nbsp; We offer free parking in our on-site lot.              10/28/2024 10:45 AM CYSTOSCOPY 15 min MG UROLOGY Panda Quintanilla MD              12/16/2024 11:00 AM LAB 15 min MG LABORATORY LAB FIRST FLOOR [facility]    Location Instructions:     The clinic is located at [address] in [place].&nbsp; We offer free parking in our on-site lot.              12/16/2024 11:30 AM RETURN NEPHROLOGY 30 min MG Monie Neely DO

## 2024-10-15 ENCOUNTER — TELEPHONE (OUTPATIENT)
Dept: UROLOGY | Facility: CLINIC | Age: 74
End: 2024-10-15
Payer: MEDICARE

## 2024-10-15 NOTE — TELEPHONE ENCOUNTER
10/15 Spoke with patient and informed her of lab appointment that is scheduled prior to upcoming cystoscopy with  on 10/28/24     Future Appointments 10/15/2024 - 4/13/2025        Date Visit Type Length Department Provider     10/23/2024 10:40 AM CT ABDOMEN PELVIS WWO 20 min MG CT SCAN MGCT1    Location Instructions:     [facility] [address] [place] [address]  Parking Imaging customers can park in the lots on either side of the driveway leading to the West Entrance of the building.  Entrance and check-in location Enter through the West Entrance doors. Proceed straight ahead, through the lobby, to Check-In B (adjacent to the [facility]). Please check-in at with the registration staff at the desk labeled Check-In B.              10/28/2024 10:15 AM LAB 15 min MG LABORATORY LAB FIRST FLOOR [facility]    Location Instructions:     The clinic is located at [address] in [place].&nbsp; We offer free parking in our on-site lot.              10/28/2024 10:45 AM CYSTOSCOPY 15 min MG UROLOGY Panda Quintanilla MD              12/16/2024 11:00 AM LAB 15 min MG LABORATORY LAB FIRST FLOOR [facility]    Location Instructions:     The clinic is located at [address] in [place].&nbsp; We offer free parking in our on-site lot.              12/16/2024 11:30 AM RETURN NEPHROLOGY 30 min MG NEPHRO Monie Perze,                      Patient verbalized understanding and had no further questions at this time.   Ester Santana MA on 10/15/2024 at 1:39 PM

## 2024-10-30 ENCOUNTER — TELEPHONE (OUTPATIENT)
Dept: UROLOGY | Facility: CLINIC | Age: 74
End: 2024-10-30
Payer: MEDICARE

## 2024-10-30 NOTE — TELEPHONE ENCOUNTER
"[facility] Call Center    Phone Message    May a detailed message be left on voicemail: no    Reason for Call: Other: Patient states she keeps getting calls from urology. States this is regarding scheduling an appointment. Patient does not want to receive any calls from urology. States if she wants anything she will contact us. Please do not contact patient any further per her request.    If care team can make a permanent note in chart that patient does not want any further contact from this department. Patient states \"They have called about 10 times and I am serious do not call.\"  Action Taken: Other: Urology    Travel Screening: Not Applicable                                                                       "

## 2024-11-08 NOTE — CONFIDENTIAL NOTE
Note below reviewed by writer and with Crystal TORRES Supervisor. Future lab orders from Dr. Quintanilla cancelled as patient no longer wants to be contacted by urology. Permanent comment added to chart.     Natacha Masters RN, BSN

## 2024-12-04 DIAGNOSIS — N18.32 STAGE 3B CHRONIC KIDNEY DISEASE (H): Primary | ICD-10-CM

## 2024-12-26 ENCOUNTER — MYC MEDICAL ADVICE (OUTPATIENT)
Dept: UROLOGY | Facility: CLINIC | Age: 74
End: 2024-12-26
Payer: MEDICARE

## 2024-12-26 NOTE — TELEPHONE ENCOUNTER
Received request from patient to schedule a cystoscopy with Dr. Quintanilla. Patient requested on  10/30/24 that Urology stop contacting patient. Sent Mychart to patient with the scheduling number.     Vanessa Farrar LPN on 12/26/2024 at 9:25 AM        
The patient is a 57y Female complaining of weakness.

## 2025-01-21 ENCOUNTER — LAB (OUTPATIENT)
Dept: LAB | Facility: CLINIC | Age: 75
End: 2025-01-21
Payer: COMMERCIAL

## 2025-01-21 DIAGNOSIS — N18.32 STAGE 3B CHRONIC KIDNEY DISEASE (H): ICD-10-CM

## 2025-01-21 LAB
ERYTHROCYTE [DISTWIDTH] IN BLOOD BY AUTOMATED COUNT: 13.4 % (ref 10–15)
HCT VFR BLD AUTO: 41.8 % (ref 35–47)
HGB BLD-MCNC: 13.7 G/DL (ref 11.7–15.7)
MCH RBC QN AUTO: 29.3 PG (ref 26.5–33)
MCHC RBC AUTO-ENTMCNC: 32.8 G/DL (ref 31.5–36.5)
MCV RBC AUTO: 89 FL (ref 78–100)
PLATELET # BLD AUTO: 249 10E3/UL (ref 150–450)
RBC # BLD AUTO: 4.68 10E6/UL (ref 3.8–5.2)
WBC # BLD AUTO: 7.4 10E3/UL (ref 4–11)

## 2025-01-21 PROCEDURE — 84156 ASSAY OF PROTEIN URINE: CPT

## 2025-01-21 PROCEDURE — 85027 COMPLETE CBC AUTOMATED: CPT

## 2025-01-21 PROCEDURE — 82570 ASSAY OF URINE CREATININE: CPT

## 2025-01-21 PROCEDURE — 36415 COLL VENOUS BLD VENIPUNCTURE: CPT

## 2025-01-21 PROCEDURE — 82043 UR ALBUMIN QUANTITATIVE: CPT

## 2025-01-21 PROCEDURE — 80069 RENAL FUNCTION PANEL: CPT

## 2025-01-22 LAB
ALBUMIN MFR UR ELPH: 12.9 MG/DL
ALBUMIN SERPL BCG-MCNC: 4.4 G/DL (ref 3.5–5.2)
ANION GAP SERPL CALCULATED.3IONS-SCNC: 13 MMOL/L (ref 7–15)
BUN SERPL-MCNC: 33.8 MG/DL (ref 8–23)
CALCIUM SERPL-MCNC: 9.7 MG/DL (ref 8.8–10.4)
CHLORIDE SERPL-SCNC: 103 MMOL/L (ref 98–107)
CREAT SERPL-MCNC: 1.63 MG/DL (ref 0.51–0.95)
CREAT UR-MCNC: 149 MG/DL
EGFRCR SERPLBLD CKD-EPI 2021: 33 ML/MIN/1.73M2
GLUCOSE SERPL-MCNC: 93 MG/DL (ref 70–99)
HCO3 SERPL-SCNC: 23 MMOL/L (ref 22–29)
PHOSPHATE SERPL-MCNC: 3.6 MG/DL (ref 2.5–4.5)
POTASSIUM SERPL-SCNC: 4.8 MMOL/L (ref 3.4–5.3)
PROT/CREAT 24H UR: 0.09 MG/MG CR (ref 0–0.2)
SODIUM SERPL-SCNC: 139 MMOL/L (ref 135–145)

## 2025-01-23 LAB
CREAT UR-MCNC: 149 MG/DL
MICROALBUMIN UR-MCNC: <12 MG/L
MICROALBUMIN/CREAT UR: NORMAL MG/G{CREAT}

## 2025-02-04 ENCOUNTER — DOCUMENTATION ONLY (OUTPATIENT)
Dept: NEPHROLOGY | Facility: CLINIC | Age: 75
End: 2025-02-04
Payer: MEDICARE

## 2025-02-05 ENCOUNTER — TELEPHONE (OUTPATIENT)
Dept: NEPHROLOGY | Facility: CLINIC | Age: 75
End: 2025-02-05
Payer: MEDICARE

## 2025-02-05 NOTE — PROGRESS NOTES
"Pt returned call. read back Routing messages received from Lab Dept and Dr. Perez.  (Copy in previous message below)    Pt verbalized understanding and stated \"I must have read an old message, because I dont delete them\".   When asked, pt stated she has no concerns at this time.  Dr. Perez will discuss lab results at her appt 2/11/25.    Pt had no further questions.    Shelby Gaines LPN    "

## 2025-02-05 NOTE — TELEPHONE ENCOUNTER
[facility] Call Center    Phone Message    May a detailed message be left on voicemail: yes     Reason for Call: Other: Patient returning phone call about lab results. Patient is requesting a call back.     Action Taken: Message routed to:  Adult Clinics: Nephrology p [phone]    Travel Screening: Not Applicable

## 2025-02-05 NOTE — PROGRESS NOTES
Attempted to contact pt.  Line disconnected on home number after pt answered and this writer stated who was calling.    2nd attempt to contact pt.  No answer. Message left on home VM to return call.  No answer on Cell number.  Message left to return call.    Shelby Gaines LPN    (Calling to discuss routing message received from Lab dept and Dr. Perez:)     Monie Perez DO  [facility] Nephrology Adult [place]2 hours ago (8:46 AM)     Hi Team -    Confused by this message I received from lab. I sent result note telling patient we would discuss labs at her visit next week. Can you clarify with patient what her concern is at this time?    DO Nohemi Marion Abbi L Woerner, Katti Lauren, DO20 hours ago (3:16 PM)     Patient had labs completed 1/21/25. Patient under the impression her urine was abnormal and needs to be repeated. Please advise.    Thanks,  Maddison RAY MLT  [facility]

## 2025-02-05 NOTE — TELEPHONE ENCOUNTER
Spoke with pt.  Documented in previous Documentation Encounter dated 2/04/25.    Shelby Gaines LPN

## 2025-02-10 ENCOUNTER — TRANSCRIBE ORDERS (OUTPATIENT)
Dept: OTHER | Age: 75
End: 2025-02-10

## 2025-02-10 DIAGNOSIS — M48.062 SPINAL STENOSIS OF LUMBAR REGION WITH NEUROGENIC CLAUDICATION: Primary | ICD-10-CM

## 2025-02-10 DIAGNOSIS — M51.362 DEGENERATION OF INTERVERTEBRAL DISC OF LUMBAR REGION WITH DISCOGENIC BACK PAIN AND LOWER EXTREMITY PAIN: ICD-10-CM

## 2025-02-10 DIAGNOSIS — M47.816 LUMBAR FACET ARTHROPATHY: ICD-10-CM

## 2025-02-10 DIAGNOSIS — M47.816 LUMBAR SPONDYLOSIS: ICD-10-CM

## 2025-02-11 ENCOUNTER — VIRTUAL VISIT (OUTPATIENT)
Dept: NEPHROLOGY | Facility: CLINIC | Age: 75
End: 2025-02-11
Payer: COMMERCIAL

## 2025-02-11 VITALS — WEIGHT: 225 LBS | BODY MASS INDEX: 37.49 KG/M2 | HEIGHT: 65 IN

## 2025-02-11 DIAGNOSIS — E11.21 DIABETES MELLITUS WITH KIDNEY DISEASE (H): ICD-10-CM

## 2025-02-11 DIAGNOSIS — E03.9 HYPOTHYROIDISM, UNSPECIFIED TYPE: ICD-10-CM

## 2025-02-11 DIAGNOSIS — G47.33 OSA (OBSTRUCTIVE SLEEP APNEA): ICD-10-CM

## 2025-02-11 DIAGNOSIS — I10 BENIGN ESSENTIAL HYPERTENSION: ICD-10-CM

## 2025-02-11 DIAGNOSIS — N18.32 STAGE 3B CHRONIC KIDNEY DISEASE (H): Primary | ICD-10-CM

## 2025-02-11 RX ORDER — TIRZEPATIDE 15 MG/.5ML
INJECTION, SOLUTION SUBCUTANEOUS
COMMUNITY

## 2025-02-11 ASSESSMENT — PAIN SCALES - GENERAL: PAINLEVEL_OUTOF10: NO PAIN (0)

## 2025-02-11 NOTE — NURSING NOTE
Current patient location: [address] [place] [id number]    Is the patient currently in the state of [place]? YES    Visit mode: VIDEO    If the visit is dropped, the patient can be reconnected by:VIDEO VISIT: Text to cell phone:   Telephone Information:   Mobile [phone]       Will anyone else be joining the visit? NO  (If patient encounters technical issues they should call [phone]    Are changes needed to the allergy or medication list? No    Are refills needed on medications prescribed by this physician? NO    Rooming Documentation:  Questionnaire(s) completed    Reason for visit: RECHECK (F/U)    Yeimi GALLEGOS

## 2025-02-11 NOTE — PROGRESS NOTES
Virtual Visit Details    Type of service:  Video Visit     Originating Location (pt. Location): Home    Distant Location (provider location):  Off-site  Platform used for Video Visit: Kush    02/11/25     Assessment/Plan:   CKD Stage 3: in the setting of longstanding hypertension/diabetes/previous KRISTYN. Also at risk for kidney disease in the setting of obesity and hx of nephrolithiasis. Recommend good diabetes and blood pressure control, avoidance of NSAIDs, weight optimization, and good hydration. Calcium is at the upper limit of normal - want to avoid hypercalcemia  Hypertension: goal BP is less than 130/80 - at goal at home  DM; last A1C 6.3%  Hypothryoidism: TSH 0.93 at goal last year  GERD: given calcium is at the upper limit of normal, would like to avoid her taking too much tums or rolaids. Encourage her to trial famotidine as needed for GERD.   MARLYN: on CPAP.   Obesity: much success with weight loss - congratulated her on her success.     Patient Instructions   Labs and follow-up in 6 months  IN the sanaz-operative period, avoid all NSAIDs.        Monie Perez DO    02/11/25   This patient was self referred for evaluation of CKD.     CC: CKD    HPI: Mai Dutton is a 74 year old female who presents for evaluation of CKD. Ms. Dutton was previously seen by nephrology in [facility] but presents today to establish in our system. ON review of her creatinine levels, they have been 0.8-1 from [id number], 1.1-1.3 from [id number]. She had an KRISTYN in April 2023 - creatinine peaked at 2.41 during a left obstructing stone and UTI - ureteral stent placed. Creatinine since this KRISTYN has been 1.6-1.9. Ultrasound in [facility] 2023 was normal other than a simple cyst noted on the left; possible nonobstructing stone on left. She was seen by urology in Dec 2023 and no intervention was warranted. IN regards to risk factors for kidney disease, she has diabetes - dx in her 60s - she denies any difficulty with retinopathy. Addt hx  includes hypertension - dx in her 60s at least. She is on ozempic but denies any success with weight loss. No NSAID use. She was previously living on aleve per her report - stopped 1-2 years ago. She denies any gross hematuria but reports dark urine at times. She has lymphedema. Uses tums PRN - last dose last PM - uses typically a couple of times per week. She has difficulty with orthopedic issues/back pain - difficulty with spinal stenosis. She has plans to have left knee replaced - rpeviously with right knee replacement. No family hx of kidney disease. No hx of pregnancy/preeclampsia. She is a retired RN.     02/11/25: video visit.Creatinine 1.68 last summer and 1.63 now. Creatinine 1.5-1.8 at [facility] since KRISTYN event in April 2023. No kidney stones or other events since last visit. Only change since last visit - on mounjaro - lost a lot of weight - 30 lbs. She has lost her appetite with the drug. BP has been low - 130, 122/70. Lymphedema in left leg - stable - she can now cross her legs which is much improved. Total knee scheduled for the 26th. Now on CPAP. Limited with her walking due to back pain - will be seeing a spine specialist through [facility].     Current Outpatient Medications   Medication Sig Dispense Refill    acetaminophen (TYLENOL) 325 MG tablet Take 2 tablets (650 mg) by mouth every 4 hours as needed for other (mild pain) 100 tablet 0    atorvastatin (LIPITOR) 40 MG tablet Take 40 mg by mouth At Bedtime      Cholecalciferol (VITAMIN D3) 2000 UNIT CAPS Take 50 mcg by mouth daily       Cyanocobalamin (B-12) 1000 MCG TBCR Take 1,000 mcg by mouth daily      DULoxetine (CYMBALTA) 30 MG capsule Take 60 mg by mouth daily (2 X 30MG)      hydrochlorothiazide (HYDRODIURIL) 25 MG tablet Take 12.5 mg by mouth daily      levothyroxine (TIROSINT) 150 MCG capsule Take 150 mcg by mouth daily       losartan (COZAAR) 50 MG tablet Take 50 mg by mouth daily      MOUNJARO 15 MG/0.5ML SOAJ INJECT 15 MG UNDER SKIN  "ONCE WEEKLY*      pregabalin (LYRICA) 50 MG capsule       tiZANidine (ZANAFLEX) 2 MG tablet take 1 tablet by mouth every 8 hours as needed not to exceed 3 doses in 24 hours*      Turmeric (QC TUMERIC COMPLEX PO)        Current Facility-Administered Medications   Medication Dose Route Frequency Provider Last Rate Last Admin    lidocaine (XYLOCAINE) 2 % external gel   Urethral Once Panda Quintanilla MD           Exam:  Ht 1.651 m (5' 5\")   Wt 102.1 kg (225 lb)   BMI 37.44 kg/m    GENERAL APPEARANCE: alert and no distress  CV - RRR  R - CTAB  Ext - no pitting edema; lymphedema present    Results:    Lab on 06/24/2024   Component Date Value Ref Range Status    WBC Count 06/24/2024 9.4  4.0 - 11.0 10e3/uL Final    RBC Count 06/24/2024 4.89  3.80 - 5.20 10e6/uL Final    Hemoglobin 06/24/2024 14.1  11.7 - 15.7 g/dL Final    Hematocrit 06/24/2024 44.6  35.0 - 47.0 % Final    MCV 06/24/2024 91  78 - 100 fL Final    MCH 06/24/2024 28.8  26.5 - 33.0 pg Final    MCHC 06/24/2024 31.6  31.5 - 36.5 g/dL Final    RDW 06/24/2024 14.2  10.0 - 15.0 % Final    Platelet Count 06/24/2024 298  150 - 450 10e3/uL Final    Parathyroid Hormone Intact 06/24/2024 65  15 - 65 pg/mL Final    Total Protein Urine mg/dL 06/24/2024 10.0    mg/dL Final    The reference ranges have not been established in urine protein. The results should be integrated into the clinical context for interpretation.    Total Protein Urine mg/mg Creat 06/24/2024 0.09  0.00 - 0.20 mg/mg Cr Final    Creatinine Urine mg/dL 06/24/2024 107.0  mg/dL Final    The reference ranges have not been established in urine creatinine. The results should be integrated into the clinical context for interpretation.    Sodium 06/24/2024 139  135 - 145 mmol/L Final    Reference intervals for this test were updated on 09/26/2023 to more accurately reflect our healthy population. There may be differences in the flagging of prior results with similar values performed with this method. " Interpretation of those prior results can be made in the context of the updated reference intervals.     Potassium 06/24/2024 4.5  3.4 - 5.3 mmol/L Final    Chloride 06/24/2024 102  98 - 107 mmol/L Final    Carbon Dioxide (CO2) 06/24/2024 25  22 - 29 mmol/L Final    Anion Gap 06/24/2024 12  7 - 15 mmol/L Final    Glucose 06/24/2024 137 (H)  70 - 99 mg/dL Final    Urea Nitrogen 06/24/2024 30.9 (H)  8.0 - 23.0 mg/dL Final    Creatinine 06/24/2024 1.68 (H)  0.51 - 0.95 mg/dL Final    GFR Estimate 06/24/2024 32 (L)  >60 mL/min/1.73m2 Final    eGFR calculated using 2021 CKD-EPI equation.    Calcium 06/24/2024 10.1  8.8 - 10.2 mg/dL Final    Albumin 06/24/2024 4.8  3.5 - 5.2 g/dL Final    Phosphorus 06/24/2024 4.1  2.5 - 4.5 mg/dL Final    Color Urine 06/24/2024 Yellow  Colorless, Straw, Light Yellow, Yellow Final    Appearance Urine 06/24/2024 Slightly Cloudy (A)  Clear Final    Glucose Urine 06/24/2024 Negative  Negative mg/dL Final    Bilirubin Urine 06/24/2024 Negative  Negative Final    Ketones Urine 06/24/2024 Negative  Negative mg/dL Final    Specific Gravity Urine 06/24/2024 1.015  0.999 - 1.035 Final    Blood Urine 06/24/2024 Trace (A)  Negative Final    pH Urine 06/24/2024 5.5  5.0 - 7.0 Final    Protein Albumin Urine 06/24/2024 Negative  Negative mg/dL Final    Urobilinogen Urine 06/24/2024 Normal  Normal, 2.0 mg/dL Final    Nitrite Urine 06/24/2024 Negative  Negative Final    Leukocyte Esterase Urine 06/24/2024 Negative  Negative Final    Vitamin D, Total (25-Hydroxy) 06/24/2024 43  20 - 50 ng/mL Final    optimum levels    CK 06/24/2024 48  26 - 192 U/L Final    Bacteria Urine 06/24/2024 Moderate (A)  None Seen /HPF Final    RBC Urine 06/24/2024 25-50 (A)  0-2 /HPF /HPF Final    WBC Urine 06/24/2024 0-5  0-5 /HPF /HPF Final    Squamous Epithelials Urine 06/24/2024 Many (A)  None Seen /LPF Final    Amorphous Crystals Urine 06/24/2024 Many (A)  None Seen /HPF Final      Lab results were reviewed and interpreted.        [id number] AM video visit via "[employer] - offsite.   Monie Perez,

## 2025-02-11 NOTE — Clinical Note
She is on mounjaro but I didn't know how to add to list without it making look like I prescribed it. Monie Perez, DO

## 2025-02-12 ENCOUNTER — TELEPHONE (OUTPATIENT)
Dept: ORTHOPEDICS | Facility: CLINIC | Age: 75
End: 2025-02-12
Payer: MEDICARE

## 2025-02-12 NOTE — TELEPHONE ENCOUNTER
Action February 12, 2025 8:57 AM MT   Action Taken Sent a request for imaging from [facility] and [facility].       DIAGNOSIS:   Spinal stenosis of lumbar region with neurogenic claudication [M48.062]  - Primary  Lumbar spondylosis [M47.816]  Degeneration of intervertebral disc of lumbar region with discogenic back pain and lower extremity pain [M51.362]  Lumbar facet arthropathy [M47.816]   APPOINTMENT DATE: 02/19/2025   NOTES STATUS DETAILS   OFFICE NOTE from referring provider Care Everywhere 01/21/2025 - Donn White DO - [facility]   OFFICE NOTE from other specialist Care Everywhere 12/14/2021 - Bel Perez MD - [facility]   OPERATIVE REPORT Care Everywhere 04/20/2023 - CYSTOSCOPY PLACEMENT LEFT URETERAL STENT, LEFT RETROGRADES    04/27/2010 - ROBOTIC ASSISTED LAPAROSCOPIC TOTAL HYSTERECTOMY     DEXA In process [facility]:  01/24/2025, 12/01/2020, 11/09/2017, 12/01/2014, 02/06/2012 - Hips/Spine   MRI In process [facility]:  12/10/2021 - L Spine    [facility]:  08/10/2006 - L Spine   NUC MED BONE SCAN In process [facility]:  02/05/2025 - Whole Body   CT SCAN In process [facility]:  04/19/2023, 10/02/2013 - Abdomen/Pelvis  06/01/2012 - PET Skull to Thigh    [facility]:  05/14/2022 - Abdomen/Pelvis   XRAYS (IMAGES & REPORTS) In process [facility]:  06/18/2024, 04/22/2023, 11/25/2019 - Chest  10/01/2019 - L Spine

## 2025-02-12 NOTE — TELEPHONE ENCOUNTER
Writer called and left pt a voice message to as provider needs to reschedule clinic on 2/19/25. Writer offered pt 2/26/25 at 11 am. Pt to call clinic to confirm.     Natacha Manriquez LPN

## 2025-02-19 ENCOUNTER — PRE VISIT (OUTPATIENT)
Dept: ORTHOPEDICS | Facility: CLINIC | Age: 75
End: 2025-02-19

## 2025-02-22 ENCOUNTER — HEALTH MAINTENANCE LETTER (OUTPATIENT)
Age: 75
End: 2025-02-22

## 2025-03-06 DIAGNOSIS — M54.50 LOW BACK PAIN: Primary | ICD-10-CM

## 2025-03-12 ENCOUNTER — OFFICE VISIT (OUTPATIENT)
Dept: ORTHOPEDICS | Facility: CLINIC | Age: 75
End: 2025-03-12
Payer: COMMERCIAL

## 2025-03-12 ENCOUNTER — ANCILLARY PROCEDURE (OUTPATIENT)
Dept: GENERAL RADIOLOGY | Facility: CLINIC | Age: 75
End: 2025-03-12
Attending: ORTHOPAEDIC SURGERY
Payer: COMMERCIAL

## 2025-03-12 VITALS — HEIGHT: 64 IN | WEIGHT: 228 LBS | BODY MASS INDEX: 38.93 KG/M2

## 2025-03-12 DIAGNOSIS — M47.816 LUMBAR FACET ARTHROPATHY: ICD-10-CM

## 2025-03-12 DIAGNOSIS — M48.062 SPINAL STENOSIS OF LUMBAR REGION WITH NEUROGENIC CLAUDICATION: ICD-10-CM

## 2025-03-12 DIAGNOSIS — M47.816 LUMBAR SPONDYLOSIS: ICD-10-CM

## 2025-03-12 DIAGNOSIS — M51.362 DEGENERATION OF INTERVERTEBRAL DISC OF LUMBAR REGION WITH DISCOGENIC BACK PAIN AND LOWER EXTREMITY PAIN: ICD-10-CM

## 2025-03-12 PROCEDURE — 1125F AMNT PAIN NOTED PAIN PRSNT: CPT | Performed by: ORTHOPAEDIC SURGERY

## 2025-03-12 PROCEDURE — 72110 X-RAY EXAM L-2 SPINE 4/>VWS: CPT | Performed by: STUDENT IN AN ORGANIZED HEALTH CARE EDUCATION/TRAINING PROGRAM

## 2025-03-12 PROCEDURE — 99204 OFFICE O/P NEW MOD 45 MIN: CPT | Performed by: ORTHOPAEDIC SURGERY

## 2025-03-12 RX ORDER — ZALEPLON 10 MG/1
10 CAPSULE ORAL
COMMUNITY
Start: 2024-06-03

## 2025-03-12 RX ORDER — APIXABAN 2.5 MG/1
TABLET, FILM COATED ORAL
COMMUNITY
Start: 2025-02-26

## 2025-03-12 NOTE — NURSING NOTE
"Reason For Visit:   Chief Complaint   Patient presents with    Consult     Spinal stenosis of lumbar region with neurogenic claudication  Lumbar spondylosis , Degeneration of intervertebral disc of lumbar region with discogenic back pain and lower extremity pain  Referred by Donn Mathis MD: Cherry Grace  Ref. MD: Self referred    ?  No  Occupation Retired.    Date of injury: No  Type of injury: No.    Date of surgery: No  Type of surgery: No.    Smoker: No  Request smoking cessation information: No    Ht 1.628 m (5' 4.09\")   Wt 103.4 kg (228 lb)   BMI 39.02 kg/m      Pain Assessment  Patient Currently in Pain: Yes  0-10 Pain Scale: 4  Primary Pain Location: Back    Oswestry (KELSIE) Questionnaire        2/14/2025     9:21 AM   OSWESTRY DISABILITY INDEX   Count 10    Sum 17    Oswestry Score (%) 34 %        Patient-reported        Visual Analog Pain Scale  Back Pain Scale 0-10: 4  Right leg pain: 0  Left leg pain: 9 (just had TKA)  Neck Pain Scale 0-10: 0  Right arm pain: 0  Left arm pain: 0    Promis 10 Assessment        2/14/2025     9:23 AM   PROMIS 10   In general, would you say your health is: Good   In general, would you say your quality of life is: Fair   In general, how would you rate your physical health? Fair   In general, how would you rate your mental health, including your mood and your ability to think? Good   In general, how would you rate your satisfaction with your social activities and relationships? Good   In general, please rate how well you carry out your usual social activities and roles Good   To what extent are you able to carry out your everyday physical activities such as walking, climbing stairs, carrying groceries, or moving a chair? A little   In the past 7 days, how often have you been bothered by emotional problems such as feeling anxious, depressed, or irritable? Rarely   In the past 7 days, how would you rate your fatigue on average? Mild   In the past 7 " days, how would you rate your pain on average, where 0 means no pain, and 10 means worst imaginable pain? 8   In general, would you say your health is: 3   In general, would you say your quality of life is: 2   In general, how would you rate your physical health? 2   In general, how would you rate your mental health, including your mood and your ability to think? 3   In general, how would you rate your satisfaction with your social activities and relationships? 3   In general, please rate how well you carry out your usual social activities and roles. (This includes activities at home, at work and in your community, and responsibilities as a parent, child, spouse, employee, friend, etc.) 3   To what extent are you able to carry out your everyday physical activities such as walking, climbing stairs, carrying groceries, or moving a chair? 2   In the past 7 days, how often have you been bothered by emotional problems such as feeling anxious, depressed, or irritable? 2   In the past 7 days, how would you rate your fatigue on average? 2   In the past 7 days, how would you rate your pain on average, where 0 means no pain, and 10 means worst imaginable pain? 8   Global Mental Health Score 12    Global Physical Health Score 10    PROMIS TOTAL - SUBSCORES 22        Patient-reported                Natacha Manriquez LPN

## 2025-03-12 NOTE — PROGRESS NOTES
History of present illness  Mai Dutton is a pleasant 74 year old female presenting with chronic low back pain that has not affected her legs, with no history of sciatica. She experiences pain when sitting without support and standing, which has been exacerbated by recent knee surgery. She has had previous treatments including neuro clinic visits and injections, which provided temporary relief. A procedure involving a block in her back was suggested due to the absence of a disc in the space, but she opted against it. She has undergone a diagnostic injection and a rhizotomy, which did not provide lasting relief. Imaging from April 2023 showed scoliosis in the upper lumbar spine, large bone spurs, and a fused L5-S1 segment. She also reports severe incontinence, which has progressively worsened, and previously received Botox injections with temporary improvement.    Physical exam  - MUSCULOSKELETAL: Midline low back pain at the lumbosacral junction, no tenderness higher up, scoliosis at the upper lumbar spine, large bone spurs around the disk space, disc space narrowing at L4-5 and L3-4, vacuum disc phenomenon in multiple disk spaces, significant arthritis around facet joints, nerve root compression at L3-4 due to bone spurs.    Imaging/Results  Imaging results reveal significant findings in the lumbar spine. There is scoliosis localized at the upper lumbar spine, with large bone spurs around the disk space. The L5-S1 segment appears completely fused, suggesting possible abnormal transitional anatomy. The disk spaces at L4-5 and L3-4 have lost significant height, with a vacuum phenomenon indicating motion through these spaces. Facet joints show arthritic changes, with bone spurs contributing to nerve root compression, particularly at L3-4. A previous CT scan from April 2023 confirms these findings, showing loss of disc height and bone spurs impacting nerve root exit points.    Assessment & Plan  Chronic Low  Back Pain:  Chronic low back pain with significant degenerative changes, including scoliosis at the upper lumbar spine, large bone spurs, and loss of disc height at multiple levels. Previous medial branch blocks provided good relief, indicating facet joint involvement. The L5-S1 segment appears completely fused, and there is a vacuum phenomenon at other levels, suggesting motion through the disc spaces.  Recommend repeating the diagnostic medial branch block to assess its effectiveness. If effective, consider radiofrequency ablation or surgical nerve transection via endoscopy. Discuss the possibility of a spinal cord stimulator if the block is ineffective. Schedule the procedure with [facility] and follow up after the procedure to evaluate the response.    Incontinence:  Progressive incontinence potentially related to spinal stenosis, with a history of uterine cancer and lymph node dissection. Previous Botox injections provided temporary relief.  Suggest consultation with gynecology or urology for further evaluation, including a possible urodynamic study to assess bladder function.    Time spent on this clinical encounter including previsit chart review, history and physical examination, patient counseling and documentation was 45 minutes on the date of encounter.    Hakeem Montana MD  , Department of Orthopedic Surgery  [facility]

## 2025-03-12 NOTE — LETTER
3/12/2025      Mai Dutton  [address] [id number]      Dear Colleague,    Thank you for referring your patient, Mai Dutton, to the [facility] [place]. Please see a copy of my visit note below.        History of present illness  Mai Dutton is a pleasant 74 year old female presenting with chronic low back pain that has not affected her legs, with no history of sciatica. She experiences pain when sitting without support and standing, which has been exacerbated by recent knee surgery. She has had previous treatments including neuro clinic visits and injections, which provided temporary relief. A procedure involving a block in her back was suggested due to the absence of a disc in the space, but she opted against it. She has undergone a diagnostic injection and a rhizotomy, which did not provide lasting relief. Imaging from April 2023 showed scoliosis in the upper lumbar spine, large bone spurs, and a fused L5-S1 segment. She also reports severe incontinence, which has progressively worsened, and previously received Botox injections with temporary improvement.    Physical exam  - MUSCULOSKELETAL: Midline low back pain at the lumbosacral junction, no tenderness higher up, scoliosis at the upper lumbar spine, large bone spurs around the disk space, disc space narrowing at L4-5 and L3-4, vacuum disc phenomenon in multiple disk spaces, significant arthritis around facet joints, nerve root compression at L3-4 due to bone spurs.    Imaging/Results  Imaging results reveal significant findings in the lumbar spine. There is scoliosis localized at the upper lumbar spine, with large bone spurs around the disk space. The L5-S1 segment appears completely fused, suggesting possible abnormal transitional anatomy. The disk spaces at L4-5 and L3-4 have lost significant height, with a vacuum phenomenon indicating motion through these spaces. Facet joints show  arthritic changes, with bone spurs contributing to nerve root compression, particularly at L3-4. A previous CT scan from April 2023 confirms these findings, showing loss of disc height and bone spurs impacting nerve root exit points.    Assessment & Plan  Chronic Low Back Pain:  Chronic low back pain with significant degenerative changes, including scoliosis at the upper lumbar spine, large bone spurs, and loss of disc height at multiple levels. Previous medial branch blocks provided good relief, indicating facet joint involvement. The L5-S1 segment appears completely fused, and there is a vacuum phenomenon at other levels, suggesting motion through the disc spaces.  Recommend repeating the diagnostic medial branch block to assess its effectiveness. If effective, consider radiofrequency ablation or surgical nerve transection via endoscopy. Discuss the possibility of a spinal cord stimulator if the block is ineffective. Schedule the procedure with [facility] and follow up after the procedure to evaluate the response.    Incontinence:  Progressive incontinence potentially related to spinal stenosis, with a history of uterine cancer and lymph node dissection. Previous Botox injections provided temporary relief.  Suggest consultation with gynecology or urology for further evaluation, including a possible urodynamic study to assess bladder function.    Time spent on this clinical encounter including previsit chart review, history and physical examination, patient counseling and documentation was 45 minutes on the date of encounter.    Hakeem Montana MD  , Department of Orthopedic Surgery  [facility]        Writer faxed injection orders to [facility] [phone].     Natacha Manriquez LPN      Again, thank you for allowing me to participate in the care of your patient.        Sincerely,        Hakeme Montana MD    Electronically signed

## 2025-03-19 ENCOUNTER — TRANSFERRED RECORDS (OUTPATIENT)
Dept: HEALTH INFORMATION MANAGEMENT | Facility: CLINIC | Age: 75
End: 2025-03-19
Payer: MEDICARE

## 2025-03-25 ENCOUNTER — TELEPHONE (OUTPATIENT)
Dept: ORTHOPEDICS | Facility: CLINIC | Age: 75
End: 2025-03-25
Payer: MEDICARE

## 2025-03-25 NOTE — TELEPHONE ENCOUNTER
RN scheduled patient for follow up visit with Dr. Montana on Wednesday April 14. Patient stating that she is having a Rhizotomy tomorrow (03/26/2025) and they will discuss her symptoms at visit.     Oxana Jane RN

## 2025-03-25 NOTE — TELEPHONE ENCOUNTER
Per MRI report from [facility]:        Patient can be scheduled sooner with Dr. Montana. Will discuss findings with team if this is urgent.     Oxana Jane RN

## 2025-03-25 NOTE — TELEPHONE ENCOUNTER
Other: Patient calling to speak to care team regarding mri results follow-up looking to see Dr. Montana before May first available      Could we send this information to you in [employer] or would you prefer to receive a phone call?:   Patient would prefer a phone call   Okay to leave a detailed message?: Yes at Cell number on file:    Telephone Information:   Mobile [phone]

## 2025-03-26 ENCOUNTER — TRANSFERRED RECORDS (OUTPATIENT)
Dept: HEALTH INFORMATION MANAGEMENT | Facility: CLINIC | Age: 75
End: 2025-03-26
Payer: MEDICARE

## 2025-04-16 ENCOUNTER — VIRTUAL VISIT (OUTPATIENT)
Dept: ORTHOPEDICS | Facility: CLINIC | Age: 75
End: 2025-04-16
Payer: COMMERCIAL

## 2025-04-16 DIAGNOSIS — M41.25 OTHER IDIOPATHIC SCOLIOSIS, THORACOLUMBAR REGION: ICD-10-CM

## 2025-04-16 DIAGNOSIS — N39.42 URINARY INCONTINENCE WITHOUT SENSORY AWARENESS: ICD-10-CM

## 2025-04-16 DIAGNOSIS — M48.062 SPINAL STENOSIS OF LUMBAR REGION WITH NEUROGENIC CLAUDICATION: ICD-10-CM

## 2025-04-16 DIAGNOSIS — M40.36 FLATBACK SYNDROME OF LUMBAR REGION: ICD-10-CM

## 2025-04-16 DIAGNOSIS — M47.816 LUMBAR SPONDYLOSIS: Primary | ICD-10-CM

## 2025-04-16 NOTE — NURSING NOTE
Reason For Visit:   Chief Complaint   Patient presents with    RECHECK     Phone visit [phone], Review MRI results ([facility])       Primary MD: Cherry Grace  Ref. MD: E[facility]    ?  No  Occupation Retired.     Date of injury: No  Type of injury: No.     Date of surgery: No  Type of surgery: No.     Smoker: No  Request smoking cessation information: No    There were no vitals taken for this visit.    Pain Assessment  Patient Currently in Pain: Yes  0-10 Pain Scale: 6  Primary Pain Location: Back    Oswestry (KELSIE) Questionnaire        4/11/2025    10:22 AM   OSWESTRY DISABILITY INDEX   Count 10    Sum 23    Oswestry Score (%) 46 %        Patient-reported          Visual Analog Pain Scale  Back Pain Scale 0-10: 6  Right leg pain: 0  Left leg pain: 0  Neck Pain Scale 0-10: 0  Right arm pain: 0  Left arm pain: 0    Promis 10 Assessment        2/14/2025     9:23 AM   PROMIS 10   In general, would you say your health is: Good   In general, would you say your quality of life is: Fair   In general, how would you rate your physical health? Fair   In general, how would you rate your mental health, including your mood and your ability to think? Good   In general, how would you rate your satisfaction with your social activities and relationships? Good   In general, please rate how well you carry out your usual social activities and roles Good   To what extent are you able to carry out your everyday physical activities such as walking, climbing stairs, carrying groceries, or moving a chair? A little   In the past 7 days, how often have you been bothered by emotional problems such as feeling anxious, depressed, or irritable? Rarely   In the past 7 days, how would you rate your fatigue on average? Mild   In the past 7 days, how would you rate your pain on average, where 0 means no pain, and 10 means worst imaginable pain? 8   In general, would you say your health is: 3   In general, would you say your quality of life is:  2   In general, how would you rate your physical health? 2   In general, how would you rate your mental health, including your mood and your ability to think? 3   In general, how would you rate your satisfaction with your social activities and relationships? 3   In general, please rate how well you carry out your usual social activities and roles. (This includes activities at home, at work and in your community, and responsibilities as a parent, child, spouse, employee, friend, etc.) 3   To what extent are you able to carry out your everyday physical activities such as walking, climbing stairs, carrying groceries, or moving a chair? 2   In the past 7 days, how often have you been bothered by emotional problems such as feeling anxious, depressed, or irritable? 2   In the past 7 days, how would you rate your fatigue on average? 2   In the past 7 days, how would you rate your pain on average, where 0 means no pain, and 10 means worst imaginable pain? 8   Global Mental Health Score 12    Global Physical Health Score 10    PROMIS TOTAL - SUBSCORES 22        Patient-reported                Natacha Manriquez LPN

## 2025-04-16 NOTE — LETTER
4/16/2025      Mai Dutton  [address]  [place] [address]      Dear Colleague,    Thank you for referring your patient, Mai Dutton, to the [facility] [place]. Please see a copy of my visit note below.    Mai Dutton connected today by virtual video visit.  Patient location:  [place], [place]  Provider location: [place], [place]  Video start time:   Video end time:     We were unable to connect by video visit due to technical issues with our [employer] video platform. I connected with the patient by phone. I spent 15 minutes and 15 seconds on telephone with patient and her . I spent another 10 minutes in imaging review and documentation on the date of encounter.     Hakeem Montana MD  , Department of Orthopedic Surgery  [facility]      Mai Rodriguez is a pleasant 74 year old female with thoracolumbar scoliosis, severe degenerative disc disease in the lumbar spine with history of chronic low back pain and more recently severe incontinence..  Previously had lumbar medial branch block and rhizotomy which failed to provide lasting relief.  More recently I referred her for repeat medial branch blocks which did not improve her back pain. Given possible new neurologic deficit I recommended MRI lumbar spine to evaluate for compression of cauda equina.      She has severe spinal stenosis at L2-3 and L3-4, foraminal stenosis at L4-5 and L5-S1.   Prior radiographs showed lumbar flatback deformity and thoracolumbar scoliosis centered at L1.     While my primary concern in obtaining new MRI was her new cauda equina symptoms, her back pain is very debilitating for her.  We discussed that for cauda equina syndrome is an indication for decompression of the spinal canal at L2-3 and L3-4.  Given her severe back pain and risk of no improvement in back pain despite having a decompression alone we also  discussed the role of instrumented fusion.  Given sagittal and coronal plane imbalance I asked for her to get full spine xrays. We will have her come to [facility] to get EOS radiographs which will help to better inform surgical decision making. She was agreeable with this plan.                Again, thank you for allowing me to participate in the care of your patient.        Sincerely,        Hakeem Montana MD    Electronically signed

## 2025-04-17 NOTE — PROGRESS NOTES
Mai Dutton connected today by virtual video visit.  Patient location:  [place], [place]  Provider location: [place], [place]  Video start time:   Video end time:     We were unable to connect by video visit due to technical issues with our [employer] video platform. I connected with the patient by phone. I spent 15 minutes and 15 seconds on telephone with patient and her . I spent another 10 minutes in imaging review and documentation on the date of encounter.     Hakeem Montana MD  , Department of Orthopedic Surgery  [facility]      Mai Rodriguez is a pleasant 74 year old female with thoracolumbar scoliosis, severe degenerative disc disease in the lumbar spine with history of chronic low back pain and more recently severe incontinence..  Previously had lumbar medial branch block and rhizotomy which failed to provide lasting relief.  More recently I referred her for repeat medial branch blocks which did not improve her back pain. Given possible new neurologic deficit I recommended MRI lumbar spine to evaluate for compression of cauda equina.      She has severe spinal stenosis at L2-3 and L3-4, foraminal stenosis at L4-5 and L5-S1.   Prior radiographs showed lumbar flatback deformity and thoracolumbar scoliosis centered at L1.     While my primary concern in obtaining new MRI was her new cauda equina symptoms, her back pain is very debilitating for her.  We discussed that for cauda equina syndrome is an indication for decompression of the spinal canal at L2-3 and L3-4.  Given her severe back pain and risk of no improvement in back pain despite having a decompression alone we also discussed the role of instrumented fusion.  Given sagittal and coronal plane imbalance I asked for her to get full spine xrays. We will have her come to [facility] to get EOS radiographs which will help to better inform surgical decision making. She was agreeable with this  plan.

## 2025-04-30 ENCOUNTER — OFFICE VISIT (OUTPATIENT)
Dept: ORTHOPEDICS | Facility: CLINIC | Age: 75
End: 2025-04-30
Payer: COMMERCIAL

## 2025-04-30 ENCOUNTER — ANCILLARY PROCEDURE (OUTPATIENT)
Dept: GENERAL RADIOLOGY | Facility: CLINIC | Age: 75
End: 2025-04-30
Attending: ORTHOPAEDIC SURGERY
Payer: COMMERCIAL

## 2025-04-30 VITALS — BODY MASS INDEX: 37.15 KG/M2 | HEIGHT: 65 IN | WEIGHT: 223 LBS

## 2025-04-30 DIAGNOSIS — M48.062 SPINAL STENOSIS OF LUMBAR REGION WITH NEUROGENIC CLAUDICATION: ICD-10-CM

## 2025-04-30 DIAGNOSIS — M47.816 LUMBAR SPONDYLOSIS: ICD-10-CM

## 2025-04-30 DIAGNOSIS — M48.062 SPINAL STENOSIS OF LUMBAR REGION WITH NEUROGENIC CLAUDICATION: Primary | ICD-10-CM

## 2025-04-30 PROCEDURE — 72082 X-RAY EXAM ENTIRE SPI 2/3 VW: CPT | Performed by: STUDENT IN AN ORGANIZED HEALTH CARE EDUCATION/TRAINING PROGRAM

## 2025-04-30 PROCEDURE — 77073 BONE LENGTH STUDIES: CPT | Performed by: STUDENT IN AN ORGANIZED HEALTH CARE EDUCATION/TRAINING PROGRAM

## 2025-04-30 RX ORDER — TRIMETHOPRIM 100 MG/1
100 TABLET ORAL 2 TIMES DAILY
COMMUNITY

## 2025-04-30 RX ORDER — NIRMATRELVIR AND RITONAVIR 300-100 MG
KIT ORAL
COMMUNITY
Start: 2025-04-29 | End: 2025-05-04

## 2025-04-30 NOTE — PROGRESS NOTES
I have reviewed and updated the patient's Past Medical History, Social History, Family History and Medication List.    ALLERGIES  Amoxicillin, Bees, Lisinopril, and Wasp venom protein    Spine Surgery Follow Up    REFERRING PHYSICIAN: Referred Self   PRIMARY CARE PHYSICIAN: Mikki Dubose           Chief Complaint:   RECHECK (Xr EOS needed follow up cukristie mulligan symptoms low back pain//)      History of Present Illness:  Symptom Profile Including: location of symptoms, onset, severity, exacerbating/alleviating factors, previous treatments:        Mai Dutton is a 74 year old female who presents today for routine follow-up regarding her lumbar spine.  She was last evaluated in clinic in March.  She has thoracolumbar scoliosis along with severe degenerative disc disease in the lumbar spine.  She currently presents with 100% back pain and no radicular pain.  She has tried a medial branch block and a rhizotomy in the past which did not improve her symptoms.  She states that she has had an epidural steroid injection multiple years ago but has not had one recently.  She continues to have bladder incontinence but this has been present for multiple years.  She saw her gynecologist who recommended pelvic floor strengthening and Botox for her overactive bladder symptoms.  She presents today for discussion of further operative versus nonoperative management.  She can currently stand for 10 minutes and walk for 200 feet before she has to sit down.  She was recently diagnosed with COVID and is on Paxlovid for this.  She also was diagnosed with a UTI and is on antibiotics.    KELSIE Scores:  Oswestry (KELSIE) Questionnaire        4/25/2025    11:31 AM   OSWESTRY DISABILITY INDEX   Count 10    Sum 25    Oswestry Score (%) 50 %        Patient-reported                  Physical Exam:   PHYSICAL EXAM:   Constitutional - Patient is healthy, well-nourished and appears stated age   Respiratory - Patient is breathing normally and in  no respiratory distress.   Skin - No suspicious rashes or lesions.   Psychiatric - Normal mood and affect.   Cardiovascular - Extremities warm and well perfused.   Eyes - Visual acuity is normal to the written word.   ENT - Hearing intact to the spoken word.   GI - No abdominal distention.   Musculoskeletal -        Lumbar Spine:    Appearance - No gross stepoffs or deformities    Motor -     L2-3: Hip flexion 5/5 R and 5/5 L strength          L3/4:  Knee extension R 5/5 and L 5/5 strength         L4/5:  Foot dorsiflexion R 5/5 L 5/5 and       EHL dorsiflexion R 5/5 L 5/5 strength         S1:  Plantarflexion/Peroneal Muscles  R 5/5 and L 5/5 strength    Sensation: intact to light touch L3-S1 distribution BLE      Neurologic:      REFLEXES Left Right                  Patella 1+ 1+   Ankle jerk 1+ 1+        Clonus 0 beats 0 beats     Hip Exam:  No pain with hip log roll and no tenderness over the greater trochanters.    Alignment:  Patient stands with a neutral standing sagittal balance.          Imaging:   I ordered and independently reviewed new radiographs at this clinic visit. The results were discussed with the patient.  Findings include:    EOS x-rays obtained today demonstrate thoracolumbar scoliosis with multilevel thoracic and lumbar degenerative disc disease and disc space collapse.  There is associated facet arthrosis.  There is a mid to upper lumbar flatback deformity with thoracic kyphosis and positive sagittal balance    MRI of the lumbar spine is also reviewed and demonstrates disc protrusion causing moderate to severe spinal stenosis from L2-L5.             Assessment and Plan:   Assessment:  74 year old female with thoracolumbar scoliosis and multilevel degenerative disc disease and spinal stenosis throughout the thoracolumbar spine.     Plan:  We reviewed the diagnosis and imaging with the patient in depth.  We again confirmed that she is only having back pain at this time and is not having any  radicular symptoms.  Additionally, she tried facet injections and a dorsal rhizotomy in the past which did not result in any symptom relief.  We discussed further operative and nonoperative treatment options.  The operative treatment options would consist of either a decompression or a multilevel fusion for her adult spinal deformity.  Further nonoperative treatment options would consist of therapy, injections, medications.  We recommended the patient undergo another diagnostic epidural steroid injection without steroid.  This will help us better understand if her back pain symptoms improve.  If it does improve her symptoms, she may be candidate for a decompression only type surgery.  If the injection does not provide relief, it is uncertain that a decompression surgery would help with her symptoms.  Therefore, a big fusion type surgery would more likely be necessary in order to provide relief.  We discussed some of the risk, benefits of both of the surgical options.  The patient would like to avoid a big fusion surgery if possible.  She will plan to follow-up with us after this injection.  At that point we will discuss further operative treatment options.  Patient understands and agrees with this plan.  All questions answered to her satisfaction.      Bebeto Simeon, DO  Spine Fellow

## 2025-04-30 NOTE — NURSING NOTE
"Reason For Visit:   Chief Complaint   Patient presents with    RECHECK     Xr EOS needed follow up darío mulligan symptoms low back pain           Primary MD: Dr. Mikki Dubose  Ref. MD: EST    ?  No  Occupation Retired.     Date of injury: No  Type of injury: No.     Date of surgery: No  Type of surgery: No.     Smoker: No  Request smoking cessation information: No    Ht 1.643 m (5' 4.69\")   Wt 101.2 kg (223 lb)   BMI 37.47 kg/m      Pain Assessment  Patient Currently in Pain: Yes  0-10 Pain Scale: 5  Primary Pain Location: Back    Oswestry (KELSIE) Questionnaire        4/25/2025    11:31 AM   OSWESTRY DISABILITY INDEX   Count 10    Sum 25    Oswestry Score (%) 50 %        Patient-reported        Visual Analog Pain Scale  Back Pain Scale 0-10: 5  Right leg pain: 0  Left leg pain: 0  Neck Pain Scale 0-10: 0  Right arm pain: 0  Left arm pain: 0    Promis 10 Assessment        2/14/2025     9:23 AM   PROMIS 10   In general, would you say your health is: Good   In general, would you say your quality of life is: Fair   In general, how would you rate your physical health? Fair   In general, how would you rate your mental health, including your mood and your ability to think? Good   In general, how would you rate your satisfaction with your social activities and relationships? Good   In general, please rate how well you carry out your usual social activities and roles Good   To what extent are you able to carry out your everyday physical activities such as walking, climbing stairs, carrying groceries, or moving a chair? A little   In the past 7 days, how often have you been bothered by emotional problems such as feeling anxious, depressed, or irritable? Rarely   In the past 7 days, how would you rate your fatigue on average? Mild   In the past 7 days, how would you rate your pain on average, where 0 means no pain, and 10 means worst imaginable pain? 8   In general, would you say your health is: 3   In general, " would you say your quality of life is: 2   In general, how would you rate your physical health? 2   In general, how would you rate your mental health, including your mood and your ability to think? 3   In general, how would you rate your satisfaction with your social activities and relationships? 3   In general, please rate how well you carry out your usual social activities and roles. (This includes activities at home, at work and in your community, and responsibilities as a parent, child, spouse, employee, friend, etc.) 3   To what extent are you able to carry out your everyday physical activities such as walking, climbing stairs, carrying groceries, or moving a chair? 2   In the past 7 days, how often have you been bothered by emotional problems such as feeling anxious, depressed, or irritable? 2   In the past 7 days, how would you rate your fatigue on average? 2   In the past 7 days, how would you rate your pain on average, where 0 means no pain, and 10 means worst imaginable pain? 8   Global Mental Health Score 12    Global Physical Health Score 10    PROMIS TOTAL - SUBSCORES 22        Patient-reported                Natacha Manriquez LPN

## 2025-05-14 ENCOUNTER — MYC MEDICAL ADVICE (OUTPATIENT)
Dept: ORTHOPEDICS | Facility: CLINIC | Age: 75
End: 2025-05-14
Payer: MEDICARE

## 2025-05-24 ENCOUNTER — HEALTH MAINTENANCE LETTER (OUTPATIENT)
Age: 75
End: 2025-05-24

## 2025-05-24 ENCOUNTER — MYC MEDICAL ADVICE (OUTPATIENT)
Dept: ORTHOPEDICS | Facility: CLINIC | Age: 75
End: 2025-05-24
Payer: MEDICARE

## 2025-06-02 ENCOUNTER — TELEPHONE (OUTPATIENT)
Dept: ANESTHESIOLOGY | Facility: CLINIC | Age: 75
End: 2025-06-02
Payer: MEDICARE

## 2025-06-02 NOTE — TELEPHONE ENCOUNTER
Phoned the patient and left VM. Stated to call the pain clinic to schedule injection procedure at [phone].

## 2025-06-11 ENCOUNTER — TRANSFERRED RECORDS (OUTPATIENT)
Dept: HEALTH INFORMATION MANAGEMENT | Facility: CLINIC | Age: 75
End: 2025-06-11
Payer: MEDICARE

## 2025-06-19 ENCOUNTER — TELEPHONE (OUTPATIENT)
Dept: ANESTHESIOLOGY | Facility: CLINIC | Age: 75
End: 2025-06-19
Payer: MEDICARE

## 2025-06-19 NOTE — TELEPHONE ENCOUNTER
Third attempt: Phoned the patient's and left VM. Stated to call the pain clinic to schedule injection procedure at [phone].   8

## 2025-06-25 ENCOUNTER — OFFICE VISIT (OUTPATIENT)
Dept: ORTHOPEDICS | Facility: CLINIC | Age: 75
End: 2025-06-25
Payer: MEDICARE

## 2025-06-25 ENCOUNTER — TELEPHONE (OUTPATIENT)
Dept: ORTHOPEDICS | Facility: CLINIC | Age: 75
End: 2025-06-25

## 2025-06-25 VITALS — BODY MASS INDEX: 36.99 KG/M2 | HEIGHT: 65 IN | WEIGHT: 222 LBS

## 2025-06-25 DIAGNOSIS — M41.125 ADOLESCENT IDIOPATHIC SCOLIOSIS OF THORACOLUMBAR REGION: ICD-10-CM

## 2025-06-25 DIAGNOSIS — M51.362 DEGENERATION OF INTERVERTEBRAL DISC OF LUMBAR REGION WITH DISCOGENIC BACK PAIN AND LOWER EXTREMITY PAIN: ICD-10-CM

## 2025-06-25 DIAGNOSIS — M54.16 LUMBAR RADICULOPATHY: ICD-10-CM

## 2025-06-25 DIAGNOSIS — M48.062 SPINAL STENOSIS OF LUMBAR REGION WITH NEUROGENIC CLAUDICATION: Primary | ICD-10-CM

## 2025-06-25 PROCEDURE — 99214 OFFICE O/P EST MOD 30 MIN: CPT | Performed by: STUDENT IN AN ORGANIZED HEALTH CARE EDUCATION/TRAINING PROGRAM

## 2025-06-25 PROCEDURE — 1125F AMNT PAIN NOTED PAIN PRSNT: CPT | Performed by: STUDENT IN AN ORGANIZED HEALTH CARE EDUCATION/TRAINING PROGRAM

## 2025-06-25 NOTE — NURSING NOTE
"Reason For Visit:   Chief Complaint   Patient presents with    RECHECK     Follow up injection and discuss surgery       Primary MD: Mikki Dubose  Ref. MD: EST    ?  No  Occupation Retired.     Date of injury: No  Type of injury: No.     Date of surgery: No  Type of surgery: No.     Smoker: No  Request smoking cessation information: No    Ht 1.651 m (5' 5\")   Wt 100.7 kg (222 lb)   BMI 36.94 kg/m      Pain Assessment  Patient Currently in Pain: Yes  0-10 Pain Scale: 5  Primary Pain Location: Back    Oswestry (KELSIE) Questionnaire        6/22/2025    11:36 AM   OSWESTRY DISABILITY INDEX   Count 9    Sum 10    Oswestry Score (%) 22.22 %        Patient-reported          Visual Analog Pain Scale  Back Pain Scale 0-10: 5  Right leg pain: 0  Left leg pain: 0  Neck Pain Scale 0-10: 0  Right arm pain: 0  Left arm pain: 0    Promis 10 Assessment        6/22/2025    11:37 AM   PROMIS 10   In general, would you say your health is: Fair   In general, would you say your quality of life is: Good   In general, how would you rate your physical health? Fair   In general, how would you rate your mental health, including your mood and your ability to think? Good   In general, how would you rate your satisfaction with your social activities and relationships? Good   In general, please rate how well you carry out your usual social activities and roles Good   To what extent are you able to carry out your everyday physical activities such as walking, climbing stairs, carrying groceries, or moving a chair? Moderately   In the past 7 days, how often have you been bothered by emotional problems such as feeling anxious, depressed, or irritable? Rarely   In the past 7 days, how would you rate your fatigue on average? Mild   In the past 7 days, how would you rate your pain on average, where 0 means no pain, and 10 means worst imaginable pain? 6   In general, would you say your health is: 2   In general, would you say your " quality of life is: 3   In general, how would you rate your physical health? 2   In general, how would you rate your mental health, including your mood and your ability to think? 3   In general, how would you rate your satisfaction with your social activities and relationships? 3   In general, please rate how well you carry out your usual social activities and roles. (This includes activities at home, at work and in your community, and responsibilities as a parent, child, spouse, employee, friend, etc.) 3   To what extent are you able to carry out your everyday physical activities such as walking, climbing stairs, carrying groceries, or moving a chair? 3   In the past 7 days, how often have you been bothered by emotional problems such as feeling anxious, depressed, or irritable? 2   In the past 7 days, how would you rate your fatigue on average? 2   In the past 7 days, how would you rate your pain on average, where 0 means no pain, and 10 means worst imaginable pain? 6   Global Mental Health Score 13    Global Physical Health Score 12    PROMIS TOTAL - SUBSCORES 25        Patient-reported                Natacha Manriquez LPN

## 2025-06-25 NOTE — TELEPHONE ENCOUNTER
Writer called and left messages on home phone and mobile phone to see if pt was coming in for an in person visit or planing video or phone. Patient arrived late and will be seen in clinic.     Natacha Manriquez LPN

## 2025-06-25 NOTE — PROGRESS NOTES
RN provided packet and soap to patient, she will need to be scheduled for RN pre op telephone clinic.     Oxana Jane RN

## 2025-06-25 NOTE — LETTER
6/25/2025      Mai Dutton  [address] [address]      Dear Colleague,    Thank you for referring your patient, Mai Dutton, to the [facility] [place]. Please see a copy of my visit note below.    I have reviewed and updated the patient's Past Medical History, Social History, Family History and Medication List.    ALLERGIES  Amoxicillin, Bees, Lisinopril, and Wasp venom protein    Spine Surgery Follow Up    REFERRING PHYSICIAN: Referred Self   PRIMARY CARE PHYSICIAN: Mikki Dubose           Chief Complaint:   RECHECK (Follow up injection and discuss surgery)      History of Present Illness:  Symptom Profile Including: location of symptoms, onset, severity, exacerbating/alleviating factors, previous treatments:        Mai Dutton is a 74 year old female who presents today for follow-up regarding her low back pain.  She recently underwent a diagnostic epidural injection at L4-5 on 6/11/2025.  She states that this resulted in approximately 50% decrease in her low back pain.  She presents today for discussion of further operative versus nonoperative treatment options.  She continues to have bladder incontinence but this has been present for multiple years.  Her gynecologist recommended pelvic floor strengthening and Botox for her overactive bladder symptoms.    KELSIE Scores:  Oswestry (KELSIE) Questionnaire        6/22/2025    11:36 AM   OSWESTRY DISABILITY INDEX   Count 9    Sum 10    Oswestry Score (%) 22.22 %        Patient-reported                  Physical Exam:   PHYSICAL EXAM:   Constitutional - Patient is healthy, well-nourished and appears stated age   Respiratory - Patient is breathing normally and in no respiratory distress.   Skin - No suspicious rashes or lesions.   Psychiatric - Normal mood and affect.   Cardiovascular - Extremities warm and well perfused.   Eyes - Visual acuity is normal to the written word.   ENT - Hearing intact to  the spoken word.   GI - No abdominal distention.   Musculoskeletal -        Lumbar Spine:    Appearance - No gross stepoffs or deformities    Motor -     L2-3: Hip flexion 5/5 R and 5/5 L strength          L3/4:  Knee extension R 5/5 and L 5/5 strength         L4/5:  Foot dorsiflexion R 5/5 L 5/5 and       EHL dorsiflexion R 5/5 L 5/5 strength         S1:  Plantarflexion/Peroneal Muscles  R 5/5 and L 5/5 strength    Sensation: intact to light touch L3-S1 distribution BLE      Neurologic:      REFLEXES Left Right                  Patella 1+ 1+   Ankle jerk 1+ 1+        Clonus 0 beats 0 beats     Hip Exam:  No pain with hip log roll and no tenderness over the greater trochanters.    Alignment:  Patient stands with a neutral standing sagittal balance.          Imaging:   I ordered and independently reviewed new radiographs at this clinic visit. The results were discussed with the patient.  Findings include:    No new imaging obtained today    EOS x-rays obtained previously demonstrate thoracolumbar scoliosis with multilevel thoracic and lumbar degenerative disc disease and disc space collapse.  There is associated facet arthrosis.  There is a mid to upper lumbar flatback deformity with thoracic kyphosis and positive sagittal balance    MRI of the lumbar spine is also reviewed and demonstrates disc protrusion causing moderate to severe spinal stenosis from L2-L5.             Assessment and Plan:   Assessment:  74 year old female with thoracolumbar scoliosis and multilevel degenerative disc disease and spinal stenosis throughout the thoracolumbar spine.     Plan:  We reviewed the diagnosis and imaging with the patient in depth.  We discussed both operative and nonoperative treatment options.  Nonoperative treatment options would consist of further injections, medications, physical therapy.  However, the patient has tried these modalities and they have only resulted in temporary improvement in her symptoms.  She is  therefore interested in operative intervention.  We discussed the different types of surgical options.  The options would consist of either a decompression type surgery or a decompression and fusion type surgery.  We discussed that with a decompression only type surgery that we are hopeful that she would obtain a similar response to what she obtained with the recent injection.  She states that she would be happy if she had 50% decrease in her low back symptoms just that she had the injection.  We discussed with the patient that this would be the goal of surgery.  We discussed that a fusion type surgery may more consistently resolve her back pain but that this is a larger surgery with a longer recovery.  We discussed the risk, benefits, alternatives of a L2-5 minimally invasive decompression.  Patient understands these risks and wishes to undergo surgery.  We will plan for a left L2-5 decompression.  Patient understands and agrees with these plans      Risks of this surgery include risk of infection, risk of dural tear resulting in CSF leak which might result in headaches, or possible need for lumbar drain, or possible revision surgery in the setting of a persistent leak. Risk of seroma or hematoma requiring revision surgery. Possible nerve root injury resulting in numbness weakness or paralysis into the leg. Possible radiculitis which could result in similar symptoms or could result in significant neurogenic type pain into the leg. Risk of incomplete decompression which might require revision surgery in the future.  Risk of pars fracture or postoperative instability requiring conversion to a fusion in the future. Risk of adjacent segment problems requiring surgery in the future. Risk of incomplete relief of symptoms possibly requiring revision surgery in the future. There is a risk of blood clots in the legs or the lungs.  Furthermore, although rare, there are risks of major vessel or major organ injury from the  surgery, and risks of the anesthetic including stroke heart attack and death.    No further conservative care is indicated, and the surgery is medically necessary for the following reasons:    There is no indication for further physical therapy in this case.  Further physical therapy would delay necessary medical treatment and prolong the patient's suffering with no benefit and the delay would increase the risk of neurologic decline and/or symptom worsening unnecessarily, with no benefit to the patient and possible harm.       The patient has already trialed oral medications as listed in the medication history, and has trialed activity modification such as decreasing their bending and twisting, and decreasing their walking distance.  In spite of this, and in spite of the conservative therapies documented above, the patient has significant functional disability in their activities of daily living such as prolonged sitting and standing, prolonged walking, and daily chores, each of which are very difficult or painful because of this spinal problem.  Therefore the proposed surgery is medically necessary and the patient has failed conservative care.      There are no untreated, underlying mental health conditions (including but not limited to psychological conditions or drug or alcohol abuse) that will preclude an appropriate recovery from this surgery or that are contraindications to proceeding with surgery.       Plan:   Plan for a left L2-5 minimally invasive decompression, midline sparing  Tach visit for preoperative clearance    Bebeto Simeon DO  Spine Fellow    I reviewed the patient's history, physical examination and imaging studies with the Surgical Fellow.  In addition I individually examined the patient and developed the treatment plan. I personally performed greater the substantive portion of the encounter today including developing the treatment plan and counseling the patient regarding treatment options. I  have reviewed and edited the clinical documentation as appropriate and agree with the assessment and plan as documented.     Time spent on this clinical encounter by me, independent of the Surgical Fellow, was 30 minutes. This included chart review, history and physical examination, patient counseling, care coordination and documentation.     Hakeem Montana MD  Orthopedic Spine Surgeon  , Department of Orthopedic Surgery      RN provided packet and soap to patient, she will need to be scheduled for RN pre op telephone clinic.     Oxana Jane RN      Again, thank you for allowing me to participate in the care of your patient.        Sincerely,        Hakeem Montana MD    Electronically signed

## 2025-06-25 NOTE — PROGRESS NOTES
I have reviewed and updated the patient's Past Medical History, Social History, Family History and Medication List.    ALLERGIES  Amoxicillin, Bees, Lisinopril, and Wasp venom protein    Spine Surgery Follow Up    REFERRING PHYSICIAN: Referred Self   PRIMARY CARE PHYSICIAN: Mikki Dubose           Chief Complaint:   RECHECK (Follow up injection and discuss surgery)      History of Present Illness:  Symptom Profile Including: location of symptoms, onset, severity, exacerbating/alleviating factors, previous treatments:        Mai Dutton is a 74 year old female who presents today for follow-up regarding her low back pain.  She recently underwent a diagnostic epidural injection at L4-5 on 6/11/2025.  She states that this resulted in approximately 50% decrease in her low back pain.  She presents today for discussion of further operative versus nonoperative treatment options.  She continues to have bladder incontinence but this has been present for multiple years.  Her gynecologist recommended pelvic floor strengthening and Botox for her overactive bladder symptoms.    KELSIE Scores:  Oswestry (KELSIE) Questionnaire        6/22/2025    11:36 AM   OSWESTRY DISABILITY INDEX   Count 9    Sum 10    Oswestry Score (%) 22.22 %        Patient-reported                  Physical Exam:   PHYSICAL EXAM:   Constitutional - Patient is healthy, well-nourished and appears stated age   Respiratory - Patient is breathing normally and in no respiratory distress.   Skin - No suspicious rashes or lesions.   Psychiatric - Normal mood and affect.   Cardiovascular - Extremities warm and well perfused.   Eyes - Visual acuity is normal to the written word.   ENT - Hearing intact to the spoken word.   GI - No abdominal distention.   Musculoskeletal -        Lumbar Spine:    Appearance - No gross stepoffs or deformities    Motor -     L2-3: Hip flexion 5/5 R and 5/5 L strength          L3/4:  Knee extension R 5/5 and L 5/5 strength          L4/5:  Foot dorsiflexion R 5/5 L 5/5 and       EHL dorsiflexion R 5/5 L 5/5 strength         S1:  Plantarflexion/Peroneal Muscles  R 5/5 and L 5/5 strength    Sensation: intact to light touch L3-S1 distribution BLE      Neurologic:      REFLEXES Left Right                  Patella 1+ 1+   Ankle jerk 1+ 1+        Clonus 0 beats 0 beats     Hip Exam:  No pain with hip log roll and no tenderness over the greater trochanters.    Alignment:  Patient stands with a neutral standing sagittal balance.          Imaging:   I ordered and independently reviewed new radiographs at this clinic visit. The results were discussed with the patient.  Findings include:    No new imaging obtained today    EOS x-rays obtained previously demonstrate thoracolumbar scoliosis with multilevel thoracic and lumbar degenerative disc disease and disc space collapse.  There is associated facet arthrosis.  There is a mid to upper lumbar flatback deformity with thoracic kyphosis and positive sagittal balance    MRI of the lumbar spine is also reviewed and demonstrates disc protrusion causing moderate to severe spinal stenosis from L2-L5.             Assessment and Plan:   Assessment:  74 year old female with thoracolumbar scoliosis and multilevel degenerative disc disease and spinal stenosis throughout the thoracolumbar spine.     Plan:  We reviewed the diagnosis and imaging with the patient in depth.  We discussed both operative and nonoperative treatment options.  Nonoperative treatment options would consist of further injections, medications, physical therapy.  However, the patient has tried these modalities and they have only resulted in temporary improvement in her symptoms.  She is therefore interested in operative intervention.  We discussed the different types of surgical options.  The options would consist of either a decompression type surgery or a decompression and fusion type surgery.  We discussed that with a decompression only type  surgery that we are hopeful that she would obtain a similar response to what she obtained with the recent injection.  She states that she would be happy if she had 50% decrease in her low back symptoms just that she had the injection.  We discussed with the patient that this would be the goal of surgery.  We discussed that a fusion type surgery may more consistently resolve her back pain but that this is a larger surgery with a longer recovery.  We discussed the risk, benefits, alternatives of a L2-5 minimally invasive decompression.  Patient understands these risks and wishes to undergo surgery.  We will plan for a left L2-5 decompression.  Patient understands and agrees with these plans      Risks of this surgery include risk of infection, risk of dural tear resulting in CSF leak which might result in headaches, or possible need for lumbar drain, or possible revision surgery in the setting of a persistent leak. Risk of seroma or hematoma requiring revision surgery. Possible nerve root injury resulting in numbness weakness or paralysis into the leg. Possible radiculitis which could result in similar symptoms or could result in significant neurogenic type pain into the leg. Risk of incomplete decompression which might require revision surgery in the future.  Risk of pars fracture or postoperative instability requiring conversion to a fusion in the future. Risk of adjacent segment problems requiring surgery in the future. Risk of incomplete relief of symptoms possibly requiring revision surgery in the future. There is a risk of blood clots in the legs or the lungs.  Furthermore, although rare, there are risks of major vessel or major organ injury from the surgery, and risks of the anesthetic including stroke heart attack and death.    No further conservative care is indicated, and the surgery is medically necessary for the following reasons:    There is no indication for further physical therapy in this case.   Further physical therapy would delay necessary medical treatment and prolong the patient's suffering with no benefit and the delay would increase the risk of neurologic decline and/or symptom worsening unnecessarily, with no benefit to the patient and possible harm.       The patient has already trialed oral medications as listed in the medication history, and has trialed activity modification such as decreasing their bending and twisting, and decreasing their walking distance.  In spite of this, and in spite of the conservative therapies documented above, the patient has significant functional disability in their activities of daily living such as prolonged sitting and standing, prolonged walking, and daily chores, each of which are very difficult or painful because of this spinal problem.  Therefore the proposed surgery is medically necessary and the patient has failed conservative care.      There are no untreated, underlying mental health conditions (including but not limited to psychological conditions or drug or alcohol abuse) that will preclude an appropriate recovery from this surgery or that are contraindications to proceeding with surgery.       Plan:   Plan for a left L2-5 minimally invasive decompression, midline sparing  Tach visit for preoperative clearance    Bebeto , DO  Spine Fellow    I reviewed the patient's history, physical examination and imaging studies with the Surgical Fellow.  In addition I individually examined the patient and developed the treatment plan. I personally performed greater the substantive portion of the encounter today including developing the treatment plan and counseling the patient regarding treatment options. I have reviewed and edited the clinical documentation as appropriate and agree with the assessment and plan as documented.     Time spent on this clinical encounter by me, independent of the Surgical Fellow, was 30 minutes. This included chart review, history  and physical examination, patient counseling, care coordination and documentation.     Hakeem Montana MD  Orthopedic Spine Surgeon  , Department of Orthopedic Surgery

## 2025-06-30 RX ORDER — CEFAZOLIN SODIUM 2 G/50ML
2 SOLUTION INTRAVENOUS
OUTPATIENT
Start: 2025-06-30

## 2025-06-30 RX ORDER — CEFAZOLIN SODIUM 2 G/50ML
2 SOLUTION INTRAVENOUS SEE ADMIN INSTRUCTIONS
OUTPATIENT
Start: 2025-06-30

## 2025-06-30 RX ORDER — CELECOXIB 100 MG/1
400 CAPSULE ORAL ONCE
OUTPATIENT
Start: 2025-06-30 | End: 2025-06-30

## 2025-06-30 RX ORDER — ACETAMINOPHEN 325 MG/1
975 TABLET ORAL ONCE
OUTPATIENT
Start: 2025-06-30 | End: 2025-06-30

## 2025-07-01 ENCOUNTER — TELEPHONE (OUTPATIENT)
Dept: ORTHOPEDICS | Facility: CLINIC | Age: 75
End: 2025-07-01
Payer: MEDICARE

## 2025-07-01 NOTE — TELEPHONE ENCOUNTER
Outgoing call to schedule surgery with Dr. Montana    Spoke with: Yazmin (patient)    Reason for Surgical Date: Next Available    Date of Surgery: 8/7/25    Estimated Arrival time Discussed with Patient:  No    Location of surgery: [facility] OR     Pre-Op H&P: Preferred to schedule with PCP    Imaging: No     Additional Pre-Op Appointments: No     Post-Op Appt Date w/ NP/PA:  N/A       Post-Op Appt Date w/ Surgeon  9/24/25 at 10:45am     Additional Post-Op Appointments: No     Discussed with patient pre-op RN will call 2-3 days prior to surgery with arrival time and instructions:  Yes     Standard Surgery Packet Sent: Yes 06/25/25  via Received in clinic by clinic staff       Additional Comments: N/A      Niru Schaefer MA on 7/1/2025 at 12:20 PM

## 2025-07-03 NOTE — PROGRESS NOTES
Teaching Flowsheet     Visit Type: Telephone    Time Start: .  Time End: .  Total Time Spent: 15 min.    Surgeon: Rubén   Location of Surgery (known or anticipated): [facility]  Type of Anesthesia: General  Worker's Compensation Procedure: No    Pertinent Medical History: .  Were medical conditions reviewed and appropriate for location? Yes  BMI: Obesity Grade II BMI 35-39.9    Relevant Diagnosis: spine  Teaching Topic: preop    Person(s) involved in teaching:   Patient and   : No.   Verified Patient's Phone Number: YES: .    Caregiver//  Name: INFORMED PATIENT &  NEEDS  & ADULT MUST STAY WITH PATIENT FOR 24H AFTER ANESTHESIA    Phone Number: .   Relationship:   Consent to Communicate on file: Yes  Authorization to Share Protected Health Information- Person to person communication signed by patient and authorized the following person or people:      Motivation Level:  Asks Questions: Yes  Eager to Learn: Yes  Cooperative: Yes  Receptive (willing/able to accept information): Yes  Any cultural factors/[employer] beliefs that may influence understanding or compliance? No     Patient and Family demonstrates understanding of the following:  Reason for the appointment, diagnosis and treatment plan: Yes  Knowledge of proper use of medications and conditions for which they are ordered (with special attention to potential side effects or drug interactions): Yes  Which situations necessitate calling provider and whom to contact: Yes     Teaching Concerns Addressed:   Proper use and care of medical equip, care aids, etc.: Yes  Nutritional needs and diet plan: Yes.  SUKHWINDER TEACHING DONE  Pain management techniques: Yes  Wound Care: Yes  How and/when to access community resources: Yes  Need for pre-op with in 30 days: YES, will be done with PCP. I asked them to ensure they go over their daily medications during this visit and discuss what medications need to be stopped  before surgery and when. If you are doing a pre-op with your PCP and they are not within the [facility], I ask them to fax it to our pre-op office. Patient verbalized understanding.     PATIENT STATED SCHEDULED FOR 7-22-25 AT [facility]     Does patient have difficulty getting a ride to appointments (post-ops, PT/OT): No  Patient's plan after discharge: home with family or spouse     Instructional Materials Used/Given:  two bottles of chlorhexidine soap and a surgery packet given to patient in clinic. Instructed patient to buy or get two 8 ounces bottles of antiseptic surgical soap called 4% CHG. Common name brand of this soap are Hibiclens and Exidine. I told them they can find this at their local pharmacy, clinic or retail store. If they have trouble finding it, I told them to ask their pharmacist to help them find a substitute.   - Important Contact Info/Phone Numbers: emphasizing clinic number [phone] and after hours number [phone]  - Map/location of surgery and follow-up appointments  - Showering instructions  - [employer] Tool     -Next step: surgery scheduled for 8-7-25.    Vani Galindo RN

## 2025-07-08 ENCOUNTER — MYC MEDICAL ADVICE (OUTPATIENT)
Dept: ORTHOPEDICS | Facility: CLINIC | Age: 75
End: 2025-07-08
Payer: MEDICARE

## 2025-07-09 DIAGNOSIS — N18.32 STAGE 3B CHRONIC KIDNEY DISEASE (H): Primary | ICD-10-CM

## 2025-07-09 NOTE — TELEPHONE ENCOUNTER
See phone message 7-1-25 & My Chart message 7-8-25 from pt.    I called pt back again & told pt that I reviewed with  in clinic today 7-9-25 who ordered pt will be Observation status & stay overnight after surgery on 8-7-25 & go home next day.  Pt stated reassured because Has sleep apnea but does not have a CPAP machine because it didn't work for her.    I told pt I reviewed with [facility] surgery scheduler who confirmed in chart for surgery staff that pt will be OBS status.  Pt stated will  inform  who is .     I again reviewed preop teaching & packet & answered all questions.  Call back prn. Pt agreed.   Vani Galindo RN.

## 2025-07-10 ENCOUNTER — TRANSFERRED RECORDS (OUTPATIENT)
Dept: HEALTH INFORMATION MANAGEMENT | Facility: CLINIC | Age: 75
End: 2025-07-10
Payer: MEDICARE

## 2025-07-12 ENCOUNTER — MYC MEDICAL ADVICE (OUTPATIENT)
Dept: ORTHOPEDICS | Facility: CLINIC | Age: 75
End: 2025-07-12
Payer: MEDICARE

## 2025-08-05 ENCOUNTER — VIRTUAL VISIT (OUTPATIENT)
Dept: NEPHROLOGY | Facility: CLINIC | Age: 75
End: 2025-08-05
Payer: MEDICARE

## 2025-08-05 DIAGNOSIS — I10 BENIGN ESSENTIAL HYPERTENSION: Primary | ICD-10-CM

## 2025-08-05 DIAGNOSIS — E11.21 DIABETES MELLITUS WITH KIDNEY DISEASE (H): ICD-10-CM

## 2025-08-05 DIAGNOSIS — N18.32 STAGE 3B CHRONIC KIDNEY DISEASE (H): ICD-10-CM

## 2025-08-06 ENCOUNTER — ANESTHESIA EVENT (OUTPATIENT)
Dept: SURGERY | Facility: CLINIC | Age: 75
End: 2025-08-06
Payer: MEDICARE

## 2025-08-06 ENCOUNTER — TELEPHONE (OUTPATIENT)
Dept: NEPHROLOGY | Facility: CLINIC | Age: 75
End: 2025-08-06
Payer: MEDICARE

## 2025-08-06 ENCOUNTER — TELEPHONE (OUTPATIENT)
Dept: ORTHOPEDICS | Facility: CLINIC | Age: 75
End: 2025-08-06
Payer: MEDICARE

## 2025-08-06 ASSESSMENT — LIFESTYLE VARIABLES: TOBACCO_USE: 0

## 2025-08-07 ENCOUNTER — ANESTHESIA (OUTPATIENT)
Dept: SURGERY | Facility: CLINIC | Age: 75
End: 2025-08-07
Payer: MEDICARE

## 2025-08-07 ENCOUNTER — APPOINTMENT (OUTPATIENT)
Dept: GENERAL RADIOLOGY | Facility: CLINIC | Age: 75
DRG: 519 | End: 2025-08-07
Attending: ORTHOPAEDIC SURGERY
Payer: MEDICARE

## 2025-08-07 ENCOUNTER — HOSPITAL ENCOUNTER (INPATIENT)
Facility: CLINIC | Age: 75
DRG: 519 | End: 2025-08-07
Attending: ORTHOPAEDIC SURGERY | Admitting: ORTHOPAEDIC SURGERY
Payer: MEDICARE

## 2025-08-07 VITALS
TEMPERATURE: 98.1 F | WEIGHT: 219.58 LBS | HEART RATE: 89 BPM | BODY MASS INDEX: 36.58 KG/M2 | DIASTOLIC BLOOD PRESSURE: 50 MMHG | RESPIRATION RATE: 11 BRPM | HEIGHT: 65 IN | OXYGEN SATURATION: 93 % | SYSTOLIC BLOOD PRESSURE: 119 MMHG

## 2025-08-07 DIAGNOSIS — M48.062 SPINAL STENOSIS OF LUMBAR REGION WITH NEUROGENIC CLAUDICATION: ICD-10-CM

## 2025-08-07 DIAGNOSIS — M54.16 LUMBAR RADICULOPATHY, CHRONIC: ICD-10-CM

## 2025-08-07 DIAGNOSIS — M48.062 LUMBAR STENOSIS WITH NEUROGENIC CLAUDICATION: Primary | ICD-10-CM

## 2025-08-07 LAB
ALBUMIN SERPL BCG-MCNC: 3 G/DL (ref 3.5–5.2)
ALP SERPL-CCNC: 109 U/L (ref 40–150)
ALT SERPL W P-5'-P-CCNC: 13 U/L (ref 0–50)
ANION GAP SERPL CALCULATED.3IONS-SCNC: 11 MMOL/L (ref 7–15)
AST SERPL W P-5'-P-CCNC: 15 U/L (ref 0–45)
BILIRUB SERPL-MCNC: <0.2 MG/DL
BUN SERPL-MCNC: 36.4 MG/DL (ref 8–23)
CALCIUM SERPL-MCNC: 7.7 MG/DL (ref 8.8–10.4)
CHLORIDE SERPL-SCNC: 110 MMOL/L (ref 98–107)
CREAT SERPL-MCNC: 1.31 MG/DL (ref 0.51–0.95)
CREAT SERPL-MCNC: 1.31 MG/DL (ref 0.51–0.95)
EGFRCR SERPLBLD CKD-EPI 2021: 43 ML/MIN/1.73M2
EGFRCR SERPLBLD CKD-EPI 2021: 43 ML/MIN/1.73M2
ERYTHROCYTE [DISTWIDTH] IN BLOOD BY AUTOMATED COUNT: 15.9 % (ref 10–15)
GLUCOSE BLDC GLUCOMTR-MCNC: 112 MG/DL (ref 70–99)
GLUCOSE BLDC GLUCOMTR-MCNC: 175 MG/DL (ref 70–99)
GLUCOSE SERPL-MCNC: 122 MG/DL (ref 70–99)
HCO3 SERPL-SCNC: 16 MMOL/L (ref 22–29)
HCT VFR BLD AUTO: 30.8 % (ref 35–47)
HGB BLD-MCNC: 9.7 G/DL (ref 11.7–15.7)
MCH RBC QN AUTO: 28.8 PG (ref 26.5–33)
MCHC RBC AUTO-ENTMCNC: 31.5 G/DL (ref 31.5–36.5)
MCV RBC AUTO: 91 FL (ref 78–100)
PLATELET # BLD AUTO: 199 10E3/UL (ref 150–450)
POTASSIUM SERPL-SCNC: 4.5 MMOL/L (ref 3.4–5.3)
PROT SERPL-MCNC: 5.1 G/DL (ref 6.4–8.3)
RBC # BLD AUTO: 3.37 10E6/UL (ref 3.8–5.2)
SODIUM SERPL-SCNC: 137 MMOL/L (ref 135–145)
WBC # BLD AUTO: 10.2 10E3/UL (ref 4–11)

## 2025-08-07 PROCEDURE — 250N000013 HC RX MED GY IP 250 OP 250 PS 637: Performed by: STUDENT IN AN ORGANIZED HEALTH CARE EDUCATION/TRAINING PROGRAM

## 2025-08-07 PROCEDURE — 258N000003 HC RX IP 258 OP 636

## 2025-08-07 PROCEDURE — 01NB0ZZ RELEASE LUMBAR NERVE, OPEN APPROACH: ICD-10-PCS | Performed by: ORTHOPAEDIC SURGERY

## 2025-08-07 PROCEDURE — 258N000003 HC RX IP 258 OP 636: Performed by: ANESTHESIOLOGY

## 2025-08-07 PROCEDURE — 250N000011 HC RX IP 250 OP 636

## 2025-08-07 PROCEDURE — 999N000141 HC STATISTIC PRE-PROCEDURE NURSING ASSESSMENT: Performed by: ORTHOPAEDIC SURGERY

## 2025-08-07 PROCEDURE — 250N000009 HC RX 250: Performed by: STUDENT IN AN ORGANIZED HEALTH CARE EDUCATION/TRAINING PROGRAM

## 2025-08-07 PROCEDURE — 370N000017 HC ANESTHESIA TECHNICAL FEE, PER MIN: Performed by: ORTHOPAEDIC SURGERY

## 2025-08-07 PROCEDURE — 63047 LAM FACETEC & FORAMOT LUMBAR: CPT | Mod: GC | Performed by: ORTHOPAEDIC SURGERY

## 2025-08-07 PROCEDURE — 00NY0ZZ RELEASE LUMBAR SPINAL CORD, OPEN APPROACH: ICD-10-PCS | Performed by: ORTHOPAEDIC SURGERY

## 2025-08-07 PROCEDURE — 120N000002 HC R&B MED SURG/OB UMMC

## 2025-08-07 PROCEDURE — 250N000011 HC RX IP 250 OP 636: Performed by: STUDENT IN AN ORGANIZED HEALTH CARE EDUCATION/TRAINING PROGRAM

## 2025-08-07 PROCEDURE — 250N000009 HC RX 250

## 2025-08-07 PROCEDURE — 360N000084 HC SURGERY LEVEL 4 W/ FLUORO, PER MIN: Performed by: ORTHOPAEDIC SURGERY

## 2025-08-07 PROCEDURE — 99223 1ST HOSP IP/OBS HIGH 75: CPT | Performed by: STUDENT IN AN ORGANIZED HEALTH CARE EDUCATION/TRAINING PROGRAM

## 2025-08-07 PROCEDURE — 82962 GLUCOSE BLOOD TEST: CPT

## 2025-08-07 PROCEDURE — C1763 CONN TISS, NON-HUMAN: HCPCS | Performed by: ORTHOPAEDIC SURGERY

## 2025-08-07 PROCEDURE — 63048 LAM FACETEC &FORAMOT EA ADDL: CPT | Mod: GC | Performed by: ORTHOPAEDIC SURGERY

## 2025-08-07 PROCEDURE — 258N000003 HC RX IP 258 OP 636: Performed by: STUDENT IN AN ORGANIZED HEALTH CARE EDUCATION/TRAINING PROGRAM

## 2025-08-07 PROCEDURE — 250N000009 HC RX 250: Performed by: ORTHOPAEDIC SURGERY

## 2025-08-07 PROCEDURE — 999N000063 XR LUMBAR SPINE PORT 1 VIEW

## 2025-08-07 PROCEDURE — 250N000009 HC RX 250: Performed by: ANESTHESIOLOGY

## 2025-08-07 PROCEDURE — 80053 COMPREHEN METABOLIC PANEL: CPT | Performed by: STUDENT IN AN ORGANIZED HEALTH CARE EDUCATION/TRAINING PROGRAM

## 2025-08-07 PROCEDURE — 250N000025 HC SEVOFLURANE, PER MIN: Performed by: ORTHOPAEDIC SURGERY

## 2025-08-07 PROCEDURE — 250N000013 HC RX MED GY IP 250 OP 250 PS 637: Performed by: ANESTHESIOLOGY

## 2025-08-07 PROCEDURE — 250N000011 HC RX IP 250 OP 636: Performed by: ORTHOPAEDIC SURGERY

## 2025-08-07 PROCEDURE — 85014 HEMATOCRIT: CPT | Performed by: STUDENT IN AN ORGANIZED HEALTH CARE EDUCATION/TRAINING PROGRAM

## 2025-08-07 PROCEDURE — 710N000010 HC RECOVERY PHASE 1, LEVEL 2, PER MIN: Performed by: ORTHOPAEDIC SURGERY

## 2025-08-07 PROCEDURE — 272N000001 HC OR GENERAL SUPPLY STERILE: Performed by: ORTHOPAEDIC SURGERY

## 2025-08-07 PROCEDURE — 01NR0ZZ RELEASE SACRAL NERVE, OPEN APPROACH: ICD-10-PCS | Performed by: ORTHOPAEDIC SURGERY

## 2025-08-07 PROCEDURE — 00QT0ZZ REPAIR SPINAL MENINGES, OPEN APPROACH: ICD-10-PCS | Performed by: ORTHOPAEDIC SURGERY

## 2025-08-07 PROCEDURE — 00UT07Z SUPPLEMENT SPINAL MENINGES WITH AUTOLOGOUS TISSUE SUBSTITUTE, OPEN APPROACH: ICD-10-PCS | Performed by: ORTHOPAEDIC SURGERY

## 2025-08-07 PROCEDURE — 250N000011 HC RX IP 250 OP 636: Performed by: ANESTHESIOLOGY

## 2025-08-07 PROCEDURE — 0KBG0ZZ EXCISION OF LEFT TRUNK MUSCLE, OPEN APPROACH: ICD-10-PCS | Performed by: ORTHOPAEDIC SURGERY

## 2025-08-07 DEVICE — SEALANT DURA SEAL 5ML 20-2050: Type: IMPLANTABLE DEVICE | Site: SPINE LUMBAR | Status: FUNCTIONAL

## 2025-08-07 RX ORDER — POLYETHYLENE GLYCOL 3350 17 G/17G
17 POWDER, FOR SOLUTION ORAL DAILY
Status: DISCONTINUED | OUTPATIENT
Start: 2025-08-08 | End: 2025-08-07

## 2025-08-07 RX ORDER — FENTANYL CITRATE 50 UG/ML
INJECTION, SOLUTION INTRAMUSCULAR; INTRAVENOUS PRN
Status: DISCONTINUED | OUTPATIENT
Start: 2025-08-07 | End: 2025-08-07

## 2025-08-07 RX ORDER — BUPIVACAINE HCL/EPINEPHRINE 0.25-.0005
VIAL (ML) INJECTION
Status: COMPLETED | OUTPATIENT
Start: 2025-08-07 | End: 2025-08-07

## 2025-08-07 RX ORDER — HYDROMORPHONE HYDROCHLORIDE 1 MG/ML
0.3 INJECTION, SOLUTION INTRAMUSCULAR; INTRAVENOUS; SUBCUTANEOUS EVERY 4 HOURS PRN
Status: DISCONTINUED | OUTPATIENT
Start: 2025-08-07 | End: 2025-08-08

## 2025-08-07 RX ORDER — METHOCARBAMOL 100 MG/ML
500 INJECTION, SOLUTION INTRAMUSCULAR; INTRAVENOUS ONCE
Status: COMPLETED | OUTPATIENT
Start: 2025-08-07 | End: 2025-08-07

## 2025-08-07 RX ORDER — POLYETHYLENE GLYCOL 3350 17 G/17G
17 POWDER, FOR SOLUTION ORAL DAILY PRN
Status: DISCONTINUED | OUTPATIENT
Start: 2025-08-08 | End: 2025-08-13 | Stop reason: HOSPADM

## 2025-08-07 RX ORDER — PREGABALIN 50 MG/1
50 CAPSULE ORAL ONCE
Status: COMPLETED | OUTPATIENT
Start: 2025-08-07 | End: 2025-08-07

## 2025-08-07 RX ORDER — HYDROMORPHONE HYDROCHLORIDE 1 MG/ML
0.2 INJECTION, SOLUTION INTRAMUSCULAR; INTRAVENOUS; SUBCUTANEOUS EVERY 5 MIN PRN
Status: DISCONTINUED | OUTPATIENT
Start: 2025-08-07 | End: 2025-08-07 | Stop reason: HOSPADM

## 2025-08-07 RX ORDER — NALOXONE HYDROCHLORIDE 0.4 MG/ML
0.2 INJECTION, SOLUTION INTRAMUSCULAR; INTRAVENOUS; SUBCUTANEOUS
Status: DISCONTINUED | OUTPATIENT
Start: 2025-08-07 | End: 2025-08-13 | Stop reason: HOSPADM

## 2025-08-07 RX ORDER — SODIUM CHLORIDE 9 MG/ML
INJECTION, SOLUTION INTRAVENOUS CONTINUOUS
Status: DISCONTINUED | OUTPATIENT
Start: 2025-08-07 | End: 2025-08-13 | Stop reason: HOSPADM

## 2025-08-07 RX ORDER — OXYCODONE HYDROCHLORIDE 5 MG/1
5 TABLET ORAL EVERY 4 HOURS PRN
Status: DISCONTINUED | OUTPATIENT
Start: 2025-08-07 | End: 2025-08-08

## 2025-08-07 RX ORDER — LIDOCAINE HYDROCHLORIDE 20 MG/ML
INJECTION, SOLUTION INFILTRATION; PERINEURAL PRN
Status: DISCONTINUED | OUTPATIENT
Start: 2025-08-07 | End: 2025-08-07

## 2025-08-07 RX ORDER — DEXAMETHASONE SODIUM PHOSPHATE 10 MG/ML
INJECTION, SOLUTION INTRAMUSCULAR; INTRAVENOUS
Status: COMPLETED | OUTPATIENT
Start: 2025-08-07 | End: 2025-08-07

## 2025-08-07 RX ORDER — TOBRAMYCIN 1.2 G/30ML
INJECTION, POWDER, LYOPHILIZED, FOR SOLUTION INTRAVENOUS PRN
Status: DISCONTINUED | OUTPATIENT
Start: 2025-08-07 | End: 2025-08-07 | Stop reason: HOSPADM

## 2025-08-07 RX ORDER — LEVOTHYROXINE SODIUM 150 UG/1
150 TABLET ORAL DAILY
Status: DISCONTINUED | OUTPATIENT
Start: 2025-08-08 | End: 2025-08-13 | Stop reason: HOSPADM

## 2025-08-07 RX ORDER — NALOXONE HYDROCHLORIDE 0.4 MG/ML
0.4 INJECTION, SOLUTION INTRAMUSCULAR; INTRAVENOUS; SUBCUTANEOUS
Status: DISCONTINUED | OUTPATIENT
Start: 2025-08-07 | End: 2025-08-13 | Stop reason: HOSPADM

## 2025-08-07 RX ORDER — AMOXICILLIN 250 MG
1 CAPSULE ORAL 2 TIMES DAILY
Status: DISCONTINUED | OUTPATIENT
Start: 2025-08-07 | End: 2025-08-07

## 2025-08-07 RX ORDER — ACETAMINOPHEN 325 MG/1
975 TABLET ORAL ONCE
Status: COMPLETED | OUTPATIENT
Start: 2025-08-07 | End: 2025-08-07

## 2025-08-07 RX ORDER — CEFAZOLIN SODIUM/WATER 2 G/20 ML
2 SYRINGE (ML) INTRAVENOUS SEE ADMIN INSTRUCTIONS
Status: DISCONTINUED | OUTPATIENT
Start: 2025-08-07 | End: 2025-08-07 | Stop reason: HOSPADM

## 2025-08-07 RX ORDER — HYDROMORPHONE HYDROCHLORIDE 1 MG/ML
0.5 INJECTION, SOLUTION INTRAMUSCULAR; INTRAVENOUS; SUBCUTANEOUS EVERY 4 HOURS PRN
Status: DISCONTINUED | OUTPATIENT
Start: 2025-08-07 | End: 2025-08-08

## 2025-08-07 RX ORDER — ACETAMINOPHEN 500 MG
500-1000 TABLET ORAL EVERY 6 HOURS PRN
COMMUNITY

## 2025-08-07 RX ORDER — DULOXETIN HYDROCHLORIDE 60 MG/1
60 CAPSULE, DELAYED RELEASE ORAL DAILY
COMMUNITY

## 2025-08-07 RX ORDER — DIAZEPAM 10 MG/2ML
2.5 INJECTION, SOLUTION INTRAMUSCULAR; INTRAVENOUS
Status: COMPLETED | OUTPATIENT
Start: 2025-08-07 | End: 2025-08-07

## 2025-08-07 RX ORDER — DIAZEPAM 5 MG/1
5-10 TABLET ORAL
Status: DISCONTINUED | OUTPATIENT
Start: 2025-08-07 | End: 2025-08-07 | Stop reason: HOSPADM

## 2025-08-07 RX ORDER — ONDANSETRON 2 MG/ML
INJECTION INTRAMUSCULAR; INTRAVENOUS PRN
Status: DISCONTINUED | OUTPATIENT
Start: 2025-08-07 | End: 2025-08-07

## 2025-08-07 RX ORDER — SODIUM CHLORIDE, SODIUM LACTATE, POTASSIUM CHLORIDE, CALCIUM CHLORIDE 600; 310; 30; 20 MG/100ML; MG/100ML; MG/100ML; MG/100ML
INJECTION, SOLUTION INTRAVENOUS CONTINUOUS
Status: DISCONTINUED | OUTPATIENT
Start: 2025-08-07 | End: 2025-08-07 | Stop reason: HOSPADM

## 2025-08-07 RX ORDER — NALOXONE HYDROCHLORIDE 0.4 MG/ML
0.1 INJECTION, SOLUTION INTRAMUSCULAR; INTRAVENOUS; SUBCUTANEOUS
Status: DISCONTINUED | OUTPATIENT
Start: 2025-08-07 | End: 2025-08-07 | Stop reason: HOSPADM

## 2025-08-07 RX ORDER — VANCOMYCIN HYDROCHLORIDE 1 G/20ML
INJECTION, POWDER, LYOPHILIZED, FOR SOLUTION INTRAVENOUS PRN
Status: DISCONTINUED | OUTPATIENT
Start: 2025-08-07 | End: 2025-08-07 | Stop reason: HOSPADM

## 2025-08-07 RX ORDER — DIMENHYDRINATE 50 MG/ML
25 INJECTION, SOLUTION INTRAMUSCULAR; INTRAVENOUS
Status: DISCONTINUED | OUTPATIENT
Start: 2025-08-07 | End: 2025-08-07 | Stop reason: HOSPADM

## 2025-08-07 RX ORDER — CEFAZOLIN SODIUM/WATER 2 G/20 ML
2 SYRINGE (ML) INTRAVENOUS EVERY 8 HOURS
Status: DISCONTINUED | OUTPATIENT
Start: 2025-08-07 | End: 2025-08-07

## 2025-08-07 RX ORDER — HYDROCHLOROTHIAZIDE 12.5 MG/1
12.5 TABLET ORAL DAILY
Status: DISCONTINUED | OUTPATIENT
Start: 2025-08-07 | End: 2025-08-13 | Stop reason: HOSPADM

## 2025-08-07 RX ORDER — ARGININE/GLUTAMINE/CALCIUM BMB 7G-7G-1.5G
1 POWDER IN PACKET (EA) ORAL 2 TIMES DAILY
Status: DISCONTINUED | OUTPATIENT
Start: 2025-08-07 | End: 2025-08-13 | Stop reason: HOSPADM

## 2025-08-07 RX ORDER — PROPOFOL 10 MG/ML
INJECTION, EMULSION INTRAVENOUS PRN
Status: DISCONTINUED | OUTPATIENT
Start: 2025-08-07 | End: 2025-08-07

## 2025-08-07 RX ORDER — ONDANSETRON 4 MG/1
4 TABLET, ORALLY DISINTEGRATING ORAL EVERY 30 MIN PRN
Status: DISCONTINUED | OUTPATIENT
Start: 2025-08-07 | End: 2025-08-07 | Stop reason: HOSPADM

## 2025-08-07 RX ORDER — AMOXICILLIN 250 MG
1 CAPSULE ORAL 2 TIMES DAILY PRN
Status: DISCONTINUED | OUTPATIENT
Start: 2025-08-07 | End: 2025-08-13 | Stop reason: HOSPADM

## 2025-08-07 RX ORDER — LABETALOL HYDROCHLORIDE 5 MG/ML
10 INJECTION, SOLUTION INTRAVENOUS
Status: DISCONTINUED | OUTPATIENT
Start: 2025-08-07 | End: 2025-08-07 | Stop reason: HOSPADM

## 2025-08-07 RX ORDER — ACETAMINOPHEN 325 MG/1
975 TABLET ORAL EVERY 8 HOURS
Status: DISCONTINUED | OUTPATIENT
Start: 2025-08-08 | End: 2025-08-13 | Stop reason: HOSPADM

## 2025-08-07 RX ORDER — ONDANSETRON 2 MG/ML
4 INJECTION INTRAMUSCULAR; INTRAVENOUS EVERY 6 HOURS PRN
Status: DISCONTINUED | OUTPATIENT
Start: 2025-08-07 | End: 2025-08-13 | Stop reason: HOSPADM

## 2025-08-07 RX ORDER — HYDROMORPHONE HYDROCHLORIDE 1 MG/ML
0.4 INJECTION, SOLUTION INTRAMUSCULAR; INTRAVENOUS; SUBCUTANEOUS EVERY 5 MIN PRN
Status: DISCONTINUED | OUTPATIENT
Start: 2025-08-07 | End: 2025-08-07 | Stop reason: HOSPADM

## 2025-08-07 RX ORDER — LOSARTAN POTASSIUM 50 MG/1
50 TABLET ORAL DAILY
Status: DISCONTINUED | OUTPATIENT
Start: 2025-08-08 | End: 2025-08-13 | Stop reason: HOSPADM

## 2025-08-07 RX ORDER — TRANEXAMIC ACID 10 MG/ML
1 INJECTION, SOLUTION INTRAVENOUS ONCE
Status: COMPLETED | OUTPATIENT
Start: 2025-08-07 | End: 2025-08-07

## 2025-08-07 RX ORDER — LIDOCAINE 40 MG/G
CREAM TOPICAL
Status: DISCONTINUED | OUTPATIENT
Start: 2025-08-07 | End: 2025-08-13 | Stop reason: HOSPADM

## 2025-08-07 RX ORDER — HYDROMORPHONE HYDROCHLORIDE 1 MG/ML
0.2 INJECTION, SOLUTION INTRAMUSCULAR; INTRAVENOUS; SUBCUTANEOUS EVERY 4 HOURS PRN
Status: DISCONTINUED | OUTPATIENT
Start: 2025-08-07 | End: 2025-08-07

## 2025-08-07 RX ORDER — PROCHLORPERAZINE MALEATE 5 MG/1
5 TABLET ORAL EVERY 6 HOURS PRN
Status: DISCONTINUED | OUTPATIENT
Start: 2025-08-07 | End: 2025-08-13 | Stop reason: HOSPADM

## 2025-08-07 RX ORDER — ONDANSETRON 4 MG/1
4 TABLET, ORALLY DISINTEGRATING ORAL EVERY 6 HOURS PRN
Status: DISCONTINUED | OUTPATIENT
Start: 2025-08-07 | End: 2025-08-13 | Stop reason: HOSPADM

## 2025-08-07 RX ORDER — FENTANYL CITRATE 50 UG/ML
25 INJECTION, SOLUTION INTRAMUSCULAR; INTRAVENOUS EVERY 5 MIN PRN
Status: DISCONTINUED | OUTPATIENT
Start: 2025-08-07 | End: 2025-08-07 | Stop reason: HOSPADM

## 2025-08-07 RX ORDER — DIPHENHYDRAMINE HCL 12.5MG/5ML
12.5 LIQUID (ML) ORAL EVERY 6 HOURS PRN
Status: DISCONTINUED | OUTPATIENT
Start: 2025-08-07 | End: 2025-08-07 | Stop reason: HOSPADM

## 2025-08-07 RX ORDER — DEXMEDETOMIDINE HYDROCHLORIDE 4 UG/ML
INJECTION, SOLUTION INTRAVENOUS
Status: COMPLETED | OUTPATIENT
Start: 2025-08-07 | End: 2025-08-07

## 2025-08-07 RX ORDER — ATORVASTATIN CALCIUM 40 MG/1
40 TABLET, FILM COATED ORAL AT BEDTIME
Status: DISCONTINUED | OUTPATIENT
Start: 2025-08-07 | End: 2025-08-13 | Stop reason: HOSPADM

## 2025-08-07 RX ORDER — TRANEXAMIC ACID 10 MG/ML
1 INJECTION, SOLUTION INTRAVENOUS ONCE
Status: DISCONTINUED | OUTPATIENT
Start: 2025-08-07 | End: 2025-08-07 | Stop reason: HOSPADM

## 2025-08-07 RX ORDER — ONDANSETRON 2 MG/ML
4 INJECTION INTRAMUSCULAR; INTRAVENOUS EVERY 30 MIN PRN
Status: DISCONTINUED | OUTPATIENT
Start: 2025-08-07 | End: 2025-08-07 | Stop reason: HOSPADM

## 2025-08-07 RX ORDER — MAGNESIUM HYDROXIDE 1200 MG/15ML
LIQUID ORAL PRN
Status: DISCONTINUED | OUTPATIENT
Start: 2025-08-07 | End: 2025-08-07 | Stop reason: HOSPADM

## 2025-08-07 RX ORDER — CELECOXIB 200 MG/1
400 CAPSULE ORAL ONCE
Status: COMPLETED | OUTPATIENT
Start: 2025-08-07 | End: 2025-08-07

## 2025-08-07 RX ORDER — PREGABALIN 50 MG/1
50 CAPSULE ORAL 2 TIMES DAILY
Status: DISCONTINUED | OUTPATIENT
Start: 2025-08-07 | End: 2025-08-09

## 2025-08-07 RX ORDER — HYDROMORPHONE HYDROCHLORIDE 1 MG/ML
0.1 INJECTION, SOLUTION INTRAMUSCULAR; INTRAVENOUS; SUBCUTANEOUS EVERY 4 HOURS PRN
Status: DISCONTINUED | OUTPATIENT
Start: 2025-08-07 | End: 2025-08-07

## 2025-08-07 RX ORDER — FENTANYL CITRATE 50 UG/ML
50 INJECTION, SOLUTION INTRAMUSCULAR; INTRAVENOUS EVERY 5 MIN PRN
Status: DISCONTINUED | OUTPATIENT
Start: 2025-08-07 | End: 2025-08-07 | Stop reason: HOSPADM

## 2025-08-07 RX ORDER — HALOPERIDOL 5 MG/ML
1 INJECTION INTRAMUSCULAR
Status: DISCONTINUED | OUTPATIENT
Start: 2025-08-07 | End: 2025-08-07 | Stop reason: HOSPADM

## 2025-08-07 RX ORDER — CEFAZOLIN SODIUM 2 G/50ML
2 SOLUTION INTRAVENOUS EVERY 8 HOURS
Status: COMPLETED | OUTPATIENT
Start: 2025-08-07 | End: 2025-08-08

## 2025-08-07 RX ORDER — DULOXETIN HYDROCHLORIDE 60 MG/1
60 CAPSULE, DELAYED RELEASE ORAL DAILY
Status: DISCONTINUED | OUTPATIENT
Start: 2025-08-08 | End: 2025-08-13 | Stop reason: HOSPADM

## 2025-08-07 RX ORDER — CEFAZOLIN SODIUM/WATER 2 G/20 ML
2 SYRINGE (ML) INTRAVENOUS
Status: COMPLETED | OUTPATIENT
Start: 2025-08-07 | End: 2025-08-07

## 2025-08-07 RX ORDER — DIPHENHYDRAMINE HYDROCHLORIDE 50 MG/ML
12.5 INJECTION, SOLUTION INTRAMUSCULAR; INTRAVENOUS EVERY 6 HOURS PRN
Status: DISCONTINUED | OUTPATIENT
Start: 2025-08-07 | End: 2025-08-07 | Stop reason: HOSPADM

## 2025-08-07 RX ORDER — HYDRALAZINE HYDROCHLORIDE 20 MG/ML
2.5-5 INJECTION INTRAMUSCULAR; INTRAVENOUS EVERY 10 MIN PRN
Status: DISCONTINUED | OUTPATIENT
Start: 2025-08-07 | End: 2025-08-07 | Stop reason: HOSPADM

## 2025-08-07 RX ORDER — DEXAMETHASONE SODIUM PHOSPHATE 4 MG/ML
INJECTION, SOLUTION INTRA-ARTICULAR; INTRALESIONAL; INTRAMUSCULAR; INTRAVENOUS; SOFT TISSUE PRN
Status: DISCONTINUED | OUTPATIENT
Start: 2025-08-07 | End: 2025-08-07

## 2025-08-07 RX ORDER — BISACODYL 10 MG
10 SUPPOSITORY, RECTAL RECTAL DAILY PRN
Status: DISCONTINUED | OUTPATIENT
Start: 2025-08-10 | End: 2025-08-13 | Stop reason: HOSPADM

## 2025-08-07 RX ORDER — LIDOCAINE 40 MG/G
CREAM TOPICAL
Status: DISCONTINUED | OUTPATIENT
Start: 2025-08-07 | End: 2025-08-07 | Stop reason: HOSPADM

## 2025-08-07 RX ADMIN — ATORVASTATIN CALCIUM 40 MG: 40 TABLET, FILM COATED ORAL at 21:08

## 2025-08-07 RX ADMIN — ACETAMINOPHEN 975 MG: 325 TABLET ORAL at 15:48

## 2025-08-07 RX ADMIN — BUPIVACAINE HYDROCHLORIDE AND EPINEPHRINE BITARTRATE 60 ML: 2.5; .005 INJECTION, SOLUTION INFILTRATION; PERINEURAL at 08:05

## 2025-08-07 RX ADMIN — FENTANYL CITRATE 100 MCG: 50 INJECTION INTRAMUSCULAR; INTRAVENOUS at 07:37

## 2025-08-07 RX ADMIN — FENTANYL CITRATE 25 MCG: 50 INJECTION INTRAMUSCULAR; INTRAVENOUS at 12:55

## 2025-08-07 RX ADMIN — Medication 50 MG: at 13:18

## 2025-08-07 RX ADMIN — Medication 10 MG: at 10:08

## 2025-08-07 RX ADMIN — PHENYLEPHRINE HYDROCHLORIDE 50 MCG: 10 INJECTION INTRAVENOUS at 07:58

## 2025-08-07 RX ADMIN — LIDOCAINE HYDROCHLORIDE 80 MG: 20 INJECTION, SOLUTION INFILTRATION; PERINEURAL at 07:40

## 2025-08-07 RX ADMIN — Medication 10 MG: at 09:33

## 2025-08-07 RX ADMIN — SODIUM CHLORIDE, SODIUM LACTATE, POTASSIUM CHLORIDE, AND CALCIUM CHLORIDE: .6; .31; .03; .02 INJECTION, SOLUTION INTRAVENOUS at 07:26

## 2025-08-07 RX ADMIN — Medication 2 G: at 07:58

## 2025-08-07 RX ADMIN — FENTANYL CITRATE 50 MCG: 50 INJECTION INTRAMUSCULAR; INTRAVENOUS at 08:32

## 2025-08-07 RX ADMIN — HYDROMORPHONE HYDROCHLORIDE 0.4 MG: 1 INJECTION, SOLUTION INTRAMUSCULAR; INTRAVENOUS; SUBCUTANEOUS at 14:59

## 2025-08-07 RX ADMIN — Medication 50 MG: at 13:24

## 2025-08-07 RX ADMIN — CEFAZOLIN SODIUM 2 G: 2 SOLUTION INTRAVENOUS at 21:31

## 2025-08-07 RX ADMIN — Medication 10 MG: at 11:35

## 2025-08-07 RX ADMIN — OXYCODONE HYDROCHLORIDE 5 MG: 5 TABLET ORAL at 16:47

## 2025-08-07 RX ADMIN — PHENYLEPHRINE HYDROCHLORIDE 0.3 MCG/KG/MIN: 10 INJECTION INTRAVENOUS at 08:46

## 2025-08-07 RX ADMIN — HYDROMORPHONE HYDROCHLORIDE 0.4 MG: 1 INJECTION, SOLUTION INTRAMUSCULAR; INTRAVENOUS; SUBCUTANEOUS at 14:12

## 2025-08-07 RX ADMIN — Medication 10 MG: at 12:32

## 2025-08-07 RX ADMIN — PROPOFOL 130 MG: 10 INJECTION, EMULSION INTRAVENOUS at 07:35

## 2025-08-07 RX ADMIN — HYDROMORPHONE HYDROCHLORIDE 0.4 MG: 1 INJECTION, SOLUTION INTRAMUSCULAR; INTRAVENOUS; SUBCUTANEOUS at 15:15

## 2025-08-07 RX ADMIN — PHENYLEPHRINE HYDROCHLORIDE 50 MCG: 10 INJECTION INTRAVENOUS at 08:08

## 2025-08-07 RX ADMIN — FENTANYL CITRATE 25 MCG: 50 INJECTION INTRAMUSCULAR; INTRAVENOUS at 13:24

## 2025-08-07 RX ADMIN — DEXAMETHASONE SODIUM PHOSPHATE 8 MG: 4 INJECTION, SOLUTION INTRAMUSCULAR; INTRAVENOUS at 07:45

## 2025-08-07 RX ADMIN — Medication 100 MG: at 13:10

## 2025-08-07 RX ADMIN — TRANEXAMIC ACID 1 G: 10 INJECTION, SOLUTION INTRAVENOUS at 08:02

## 2025-08-07 RX ADMIN — FENTANYL CITRATE 50 MCG: 50 INJECTION, SOLUTION INTRAMUSCULAR; INTRAVENOUS at 14:06

## 2025-08-07 RX ADMIN — ONDANSETRON 4 MG: 2 INJECTION INTRAMUSCULAR; INTRAVENOUS at 12:47

## 2025-08-07 RX ADMIN — FENTANYL CITRATE 50 MCG: 50 INJECTION, SOLUTION INTRAMUSCULAR; INTRAVENOUS at 13:58

## 2025-08-07 RX ADMIN — DEXAMETHASONE SODIUM PHOSPHATE 2 MG: 10 INJECTION, SOLUTION INTRAMUSCULAR; INTRAVENOUS at 08:05

## 2025-08-07 RX ADMIN — PHENYLEPHRINE HYDROCHLORIDE 100 MCG: 10 INJECTION INTRAVENOUS at 09:19

## 2025-08-07 RX ADMIN — HYDROMORPHONE HYDROCHLORIDE 0.4 MG: 1 INJECTION, SOLUTION INTRAMUSCULAR; INTRAVENOUS; SUBCUTANEOUS at 14:44

## 2025-08-07 RX ADMIN — PREGABALIN 50 MG: 50 CAPSULE ORAL at 15:48

## 2025-08-07 RX ADMIN — Medication 2 G: at 11:55

## 2025-08-07 RX ADMIN — DIAZEPAM 2.5 MG: 5 INJECTION, SOLUTION INTRAMUSCULAR; INTRAVENOUS at 14:29

## 2025-08-07 RX ADMIN — METHOCARBAMOL 500 MG: 100 INJECTION, SOLUTION INTRAMUSCULAR; INTRAVENOUS at 15:19

## 2025-08-07 RX ADMIN — HYDROMORPHONE HYDROCHLORIDE 0.5 MG: 1 INJECTION, SOLUTION INTRAMUSCULAR; INTRAVENOUS; SUBCUTANEOUS at 18:21

## 2025-08-07 RX ADMIN — KETAMINE HYDROCHLORIDE 5 MG/HR: 10 INJECTION INTRAMUSCULAR; INTRAVENOUS at 15:53

## 2025-08-07 RX ADMIN — Medication 20 MG: at 08:32

## 2025-08-07 RX ADMIN — TRANEXAMIC ACID 1 G: 10 INJECTION, SOLUTION INTRAVENOUS at 12:43

## 2025-08-07 RX ADMIN — Medication 10 MG: at 11:55

## 2025-08-07 RX ADMIN — Medication 50 MG: at 07:44

## 2025-08-07 RX ADMIN — PHENYLEPHRINE HYDROCHLORIDE 100 MCG: 10 INJECTION INTRAVENOUS at 08:44

## 2025-08-07 RX ADMIN — OXYCODONE HYDROCHLORIDE 5 MG: 5 TABLET ORAL at 21:10

## 2025-08-07 RX ADMIN — PREGABALIN 50 MG: 50 CAPSULE ORAL at 21:08

## 2025-08-07 RX ADMIN — Medication 10 MG: at 10:43

## 2025-08-07 RX ADMIN — PHENYLEPHRINE HYDROCHLORIDE 100 MCG: 10 INJECTION INTRAVENOUS at 13:25

## 2025-08-07 RX ADMIN — HYDROMORPHONE HYDROCHLORIDE 0.4 MG: 1 INJECTION, SOLUTION INTRAMUSCULAR; INTRAVENOUS; SUBCUTANEOUS at 14:21

## 2025-08-07 RX ADMIN — DEXMEDETOMIDINE HYDROCHLORIDE 40 MCG: 4 INJECTION, SOLUTION INTRAVENOUS at 08:05

## 2025-08-07 RX ADMIN — SODIUM CHLORIDE: 0.9 INJECTION, SOLUTION INTRAVENOUS at 18:26

## 2025-08-07 RX ADMIN — PHENYLEPHRINE HYDROCHLORIDE 50 MCG: 10 INJECTION INTRAVENOUS at 10:13

## 2025-08-07 ASSESSMENT — ACTIVITIES OF DAILY LIVING (ADL)
ADLS_ACUITY_SCORE: 50
ADLS_ACUITY_SCORE: 50
ADLS_ACUITY_SCORE: 47
ADLS_ACUITY_SCORE: 50
ADLS_ACUITY_SCORE: 50
ADLS_ACUITY_SCORE: 33
ADLS_ACUITY_SCORE: 50
ADLS_ACUITY_SCORE: 54
ADLS_ACUITY_SCORE: 42
ADLS_ACUITY_SCORE: 42
ADLS_ACUITY_SCORE: 43
ADLS_ACUITY_SCORE: 33
ADLS_ACUITY_SCORE: 50
ADLS_ACUITY_SCORE: 54
ADLS_ACUITY_SCORE: 47
ADLS_ACUITY_SCORE: 50
ADLS_ACUITY_SCORE: 50
ADLS_ACUITY_SCORE: 42

## 2025-08-07 ASSESSMENT — COPD QUESTIONNAIRES: COPD: 0

## 2025-08-07 ASSESSMENT — ENCOUNTER SYMPTOMS: SEIZURES: 0

## 2025-08-08 LAB
ALBUMIN SERPL BCG-MCNC: 3.8 G/DL (ref 3.5–5.2)
ALP SERPL-CCNC: 127 U/L (ref 40–150)
ALT SERPL W P-5'-P-CCNC: 13 U/L (ref 0–50)
ANION GAP SERPL CALCULATED.3IONS-SCNC: 12 MMOL/L (ref 7–15)
AST SERPL W P-5'-P-CCNC: 26 U/L (ref 0–45)
BILIRUB SERPL-MCNC: 0.2 MG/DL
BUN SERPL-MCNC: 36 MG/DL (ref 8–23)
CALCIUM SERPL-MCNC: 8.2 MG/DL (ref 8.8–10.4)
CHLORIDE SERPL-SCNC: 109 MMOL/L (ref 98–107)
CREAT SERPL-MCNC: 1.52 MG/DL (ref 0.51–0.95)
EGFRCR SERPLBLD CKD-EPI 2021: 36 ML/MIN/1.73M2
ERYTHROCYTE [DISTWIDTH] IN BLOOD BY AUTOMATED COUNT: 15.8 % (ref 10–15)
GLUCOSE BLDC GLUCOMTR-MCNC: 106 MG/DL (ref 70–99)
GLUCOSE BLDC GLUCOMTR-MCNC: 117 MG/DL (ref 70–99)
GLUCOSE BLDC GLUCOMTR-MCNC: 119 MG/DL (ref 70–99)
GLUCOSE SERPL-MCNC: 115 MG/DL (ref 70–99)
HCO3 SERPL-SCNC: 21 MMOL/L (ref 22–29)
HCT VFR BLD AUTO: 35.2 % (ref 35–47)
HGB BLD-MCNC: 11 G/DL (ref 11.7–15.7)
MCH RBC QN AUTO: 28.2 PG (ref 26.5–33)
MCHC RBC AUTO-ENTMCNC: 31.3 G/DL (ref 31.5–36.5)
MCV RBC AUTO: 90 FL (ref 78–100)
PLATELET # BLD AUTO: 97 10E3/UL (ref 150–450)
POTASSIUM SERPL-SCNC: 4.5 MMOL/L (ref 3.4–5.3)
PROT SERPL-MCNC: 6.5 G/DL (ref 6.4–8.3)
RBC # BLD AUTO: 3.9 10E6/UL (ref 3.8–5.2)
SODIUM SERPL-SCNC: 142 MMOL/L (ref 135–145)
WBC # BLD AUTO: 16.8 10E3/UL (ref 4–11)

## 2025-08-08 PROCEDURE — 250N000009 HC RX 250: Performed by: ANESTHESIOLOGY

## 2025-08-08 PROCEDURE — 36415 COLL VENOUS BLD VENIPUNCTURE: CPT | Performed by: STUDENT IN AN ORGANIZED HEALTH CARE EDUCATION/TRAINING PROGRAM

## 2025-08-08 PROCEDURE — 120N000002 HC R&B MED SURG/OB UMMC

## 2025-08-08 PROCEDURE — 258N000003 HC RX IP 258 OP 636: Performed by: ANESTHESIOLOGY

## 2025-08-08 PROCEDURE — 250N000013 HC RX MED GY IP 250 OP 250 PS 637: Performed by: ORTHOPAEDIC SURGERY

## 2025-08-08 PROCEDURE — 258N000003 HC RX IP 258 OP 636: Performed by: STUDENT IN AN ORGANIZED HEALTH CARE EDUCATION/TRAINING PROGRAM

## 2025-08-08 PROCEDURE — 250N000013 HC RX MED GY IP 250 OP 250 PS 637: Performed by: PHYSICIAN ASSISTANT

## 2025-08-08 PROCEDURE — 250N000013 HC RX MED GY IP 250 OP 250 PS 637: Performed by: STUDENT IN AN ORGANIZED HEALTH CARE EDUCATION/TRAINING PROGRAM

## 2025-08-08 PROCEDURE — 250N000011 HC RX IP 250 OP 636: Performed by: NURSE PRACTITIONER

## 2025-08-08 PROCEDURE — 84450 TRANSFERASE (AST) (SGOT): CPT | Performed by: STUDENT IN AN ORGANIZED HEALTH CARE EDUCATION/TRAINING PROGRAM

## 2025-08-08 PROCEDURE — 250N000013 HC RX MED GY IP 250 OP 250 PS 637: Performed by: NURSE PRACTITIONER

## 2025-08-08 PROCEDURE — 250N000011 HC RX IP 250 OP 636: Performed by: ORTHOPAEDIC SURGERY

## 2025-08-08 PROCEDURE — 99232 SBSQ HOSP IP/OBS MODERATE 35: CPT | Performed by: INTERNAL MEDICINE

## 2025-08-08 PROCEDURE — 85018 HEMOGLOBIN: CPT | Performed by: STUDENT IN AN ORGANIZED HEALTH CARE EDUCATION/TRAINING PROGRAM

## 2025-08-08 PROCEDURE — 250N000011 HC RX IP 250 OP 636: Performed by: STUDENT IN AN ORGANIZED HEALTH CARE EDUCATION/TRAINING PROGRAM

## 2025-08-08 RX ORDER — OXYCODONE HYDROCHLORIDE 5 MG/1
5 TABLET ORAL EVERY 4 HOURS PRN
Refills: 0 | Status: DISCONTINUED | OUTPATIENT
Start: 2025-08-08 | End: 2025-08-09

## 2025-08-08 RX ORDER — HYDROMORPHONE HCL IN WATER/PF 6 MG/30 ML
0.4 PATIENT CONTROLLED ANALGESIA SYRINGE INTRAVENOUS EVERY 4 HOURS PRN
Status: DISCONTINUED | OUTPATIENT
Start: 2025-08-08 | End: 2025-08-08

## 2025-08-08 RX ORDER — OXYCODONE HYDROCHLORIDE 10 MG/1
10 TABLET ORAL EVERY 4 HOURS PRN
Refills: 0 | Status: DISCONTINUED | OUTPATIENT
Start: 2025-08-08 | End: 2025-08-09

## 2025-08-08 RX ORDER — HYDROMORPHONE HCL IN WATER/PF 6 MG/30 ML
0.2 PATIENT CONTROLLED ANALGESIA SYRINGE INTRAVENOUS EVERY 4 HOURS PRN
Status: DISCONTINUED | OUTPATIENT
Start: 2025-08-08 | End: 2025-08-08

## 2025-08-08 RX ORDER — HYDROMORPHONE HCL IN WATER/PF 6 MG/30 ML
0.4 PATIENT CONTROLLED ANALGESIA SYRINGE INTRAVENOUS
Status: DISCONTINUED | OUTPATIENT
Start: 2025-08-08 | End: 2025-08-13 | Stop reason: HOSPADM

## 2025-08-08 RX ORDER — HYDROMORPHONE HCL IN WATER/PF 6 MG/30 ML
0.2 PATIENT CONTROLLED ANALGESIA SYRINGE INTRAVENOUS
Status: DISCONTINUED | OUTPATIENT
Start: 2025-08-08 | End: 2025-08-13 | Stop reason: HOSPADM

## 2025-08-08 RX ORDER — TIZANIDINE 2 MG/1
2 TABLET ORAL EVERY 8 HOURS PRN
Status: DISCONTINUED | OUTPATIENT
Start: 2025-08-08 | End: 2025-08-09

## 2025-08-08 RX ADMIN — OXYCODONE HYDROCHLORIDE 5 MG: 5 TABLET ORAL at 14:54

## 2025-08-08 RX ADMIN — ACETAMINOPHEN 975 MG: 325 TABLET ORAL at 23:47

## 2025-08-08 RX ADMIN — OXYCODONE HYDROCHLORIDE 10 MG: 10 TABLET ORAL at 23:48

## 2025-08-08 RX ADMIN — SENNOSIDES, DOCUSATE SODIUM 1 TABLET: 50; 8.6 TABLET, FILM COATED ORAL at 19:28

## 2025-08-08 RX ADMIN — SODIUM CHLORIDE: 0.9 INJECTION, SOLUTION INTRAVENOUS at 13:14

## 2025-08-08 RX ADMIN — TIZANIDINE 2 MG: 2 TABLET ORAL at 21:35

## 2025-08-08 RX ADMIN — HYDROMORPHONE HYDROCHLORIDE 0.4 MG: 0.2 INJECTION, SOLUTION INTRAMUSCULAR; INTRAVENOUS; SUBCUTANEOUS at 18:15

## 2025-08-08 RX ADMIN — ACETAMINOPHEN 975 MG: 325 TABLET ORAL at 08:21

## 2025-08-08 RX ADMIN — OXYCODONE HYDROCHLORIDE 10 MG: 10 TABLET ORAL at 20:07

## 2025-08-08 RX ADMIN — TIZANIDINE 2 MG: 2 TABLET ORAL at 13:04

## 2025-08-08 RX ADMIN — CEFAZOLIN SODIUM 2 G: 2 SOLUTION INTRAVENOUS at 05:31

## 2025-08-08 RX ADMIN — ACETAMINOPHEN 975 MG: 325 TABLET ORAL at 02:01

## 2025-08-08 RX ADMIN — SENNOSIDES, DOCUSATE SODIUM 1 TABLET: 50; 8.6 TABLET, FILM COATED ORAL at 02:15

## 2025-08-08 RX ADMIN — ACETAMINOPHEN 975 MG: 325 TABLET ORAL at 15:56

## 2025-08-08 RX ADMIN — PREGABALIN 50 MG: 50 CAPSULE ORAL at 08:21

## 2025-08-08 RX ADMIN — OXYCODONE HYDROCHLORIDE 5 MG: 5 TABLET ORAL at 11:57

## 2025-08-08 RX ADMIN — DULOXETINE HYDROCHLORIDE 60 MG: 60 CAPSULE, DELAYED RELEASE PELLETS ORAL at 06:35

## 2025-08-08 RX ADMIN — HYDROMORPHONE HYDROCHLORIDE 0.5 MG: 1 INJECTION, SOLUTION INTRAMUSCULAR; INTRAVENOUS; SUBCUTANEOUS at 05:41

## 2025-08-08 RX ADMIN — CEFAZOLIN SODIUM 2 G: 2 SOLUTION INTRAVENOUS at 13:08

## 2025-08-08 RX ADMIN — OXYCODONE HYDROCHLORIDE 5 MG: 5 TABLET ORAL at 06:22

## 2025-08-08 RX ADMIN — PREGABALIN 50 MG: 50 CAPSULE ORAL at 19:28

## 2025-08-08 RX ADMIN — ATORVASTATIN CALCIUM 40 MG: 40 TABLET, FILM COATED ORAL at 21:35

## 2025-08-08 RX ADMIN — NALOXEGOL OXALATE 12.5 MG: 12.5 TABLET, FILM COATED ORAL at 08:22

## 2025-08-08 RX ADMIN — OXYCODONE HYDROCHLORIDE 5 MG: 5 TABLET ORAL at 02:01

## 2025-08-08 RX ADMIN — HYDROMORPHONE HYDROCHLORIDE 0.5 MG: 1 INJECTION, SOLUTION INTRAMUSCULAR; INTRAVENOUS; SUBCUTANEOUS at 10:43

## 2025-08-08 RX ADMIN — LEVOTHYROXINE SODIUM 150 MCG: 0.15 TABLET ORAL at 06:36

## 2025-08-08 RX ADMIN — KETAMINE HYDROCHLORIDE 5 MG/HR: 10 INJECTION INTRAMUSCULAR; INTRAVENOUS at 11:58

## 2025-08-08 ASSESSMENT — ACTIVITIES OF DAILY LIVING (ADL)
ADLS_ACUITY_SCORE: 43

## 2025-08-09 ENCOUNTER — APPOINTMENT (OUTPATIENT)
Dept: PHYSICAL THERAPY | Facility: CLINIC | Age: 75
DRG: 519 | End: 2025-08-09
Attending: STUDENT IN AN ORGANIZED HEALTH CARE EDUCATION/TRAINING PROGRAM
Payer: MEDICARE

## 2025-08-09 PROCEDURE — 97530 THERAPEUTIC ACTIVITIES: CPT | Mod: GP | Performed by: PHYSICAL THERAPIST

## 2025-08-09 PROCEDURE — 258N000003 HC RX IP 258 OP 636: Performed by: NURSE PRACTITIONER

## 2025-08-09 PROCEDURE — 97162 PT EVAL MOD COMPLEX 30 MIN: CPT | Mod: GP | Performed by: PHYSICAL THERAPIST

## 2025-08-09 PROCEDURE — 120N000002 HC R&B MED SURG/OB UMMC

## 2025-08-09 PROCEDURE — 250N000013 HC RX MED GY IP 250 OP 250 PS 637: Performed by: STUDENT IN AN ORGANIZED HEALTH CARE EDUCATION/TRAINING PROGRAM

## 2025-08-09 PROCEDURE — 99232 SBSQ HOSP IP/OBS MODERATE 35: CPT | Performed by: INTERNAL MEDICINE

## 2025-08-09 PROCEDURE — 250N000013 HC RX MED GY IP 250 OP 250 PS 637: Performed by: NURSE PRACTITIONER

## 2025-08-09 PROCEDURE — 250N000009 HC RX 250: Performed by: NURSE PRACTITIONER

## 2025-08-09 PROCEDURE — 250N000011 HC RX IP 250 OP 636: Performed by: NURSE PRACTITIONER

## 2025-08-09 PROCEDURE — 250N000013 HC RX MED GY IP 250 OP 250 PS 637: Performed by: ORTHOPAEDIC SURGERY

## 2025-08-09 PROCEDURE — 99223 1ST HOSP IP/OBS HIGH 75: CPT | Performed by: ANESTHESIOLOGY

## 2025-08-09 PROCEDURE — 250N000013 HC RX MED GY IP 250 OP 250 PS 637: Performed by: PHYSICIAN ASSISTANT

## 2025-08-09 PROCEDURE — 250N000013 HC RX MED GY IP 250 OP 250 PS 637: Performed by: SURGERY

## 2025-08-09 RX ORDER — PREGABALIN 75 MG/1
75 CAPSULE ORAL 2 TIMES DAILY
Status: DISCONTINUED | OUTPATIENT
Start: 2025-08-09 | End: 2025-08-11

## 2025-08-09 RX ORDER — OXYCODONE HYDROCHLORIDE 5 MG/1
5 TABLET ORAL
Refills: 0 | Status: DISCONTINUED | OUTPATIENT
Start: 2025-08-09 | End: 2025-08-11

## 2025-08-09 RX ORDER — OXYCODONE HYDROCHLORIDE 10 MG/1
10 TABLET ORAL
Refills: 0 | Status: DISCONTINUED | OUTPATIENT
Start: 2025-08-09 | End: 2025-08-11

## 2025-08-09 RX ADMIN — OXYCODONE HYDROCHLORIDE 10 MG: 10 TABLET ORAL at 23:45

## 2025-08-09 RX ADMIN — PREGABALIN 50 MG: 50 CAPSULE ORAL at 08:56

## 2025-08-09 RX ADMIN — KETAMINE HYDROCHLORIDE 5 MG/HR: 10 INJECTION INTRAMUSCULAR; INTRAVENOUS at 07:12

## 2025-08-09 RX ADMIN — ACETAMINOPHEN 975 MG: 325 TABLET ORAL at 16:15

## 2025-08-09 RX ADMIN — ACETAMINOPHEN 975 MG: 325 TABLET ORAL at 08:56

## 2025-08-09 RX ADMIN — ATORVASTATIN CALCIUM 40 MG: 40 TABLET, FILM COATED ORAL at 20:34

## 2025-08-09 RX ADMIN — OXYCODONE HYDROCHLORIDE 10 MG: 10 TABLET ORAL at 16:15

## 2025-08-09 RX ADMIN — OXYCODONE HYDROCHLORIDE 10 MG: 10 TABLET ORAL at 03:40

## 2025-08-09 RX ADMIN — LEVOTHYROXINE SODIUM 150 MCG: 0.15 TABLET ORAL at 08:56

## 2025-08-09 RX ADMIN — ACETAMINOPHEN 975 MG: 325 TABLET ORAL at 23:44

## 2025-08-09 RX ADMIN — HYDROMORPHONE HYDROCHLORIDE 0.2 MG: 0.2 INJECTION, SOLUTION INTRAMUSCULAR; INTRAVENOUS; SUBCUTANEOUS at 14:34

## 2025-08-09 RX ADMIN — SENNOSIDES, DOCUSATE SODIUM 1 TABLET: 50; 8.6 TABLET, FILM COATED ORAL at 20:52

## 2025-08-09 RX ADMIN — OXYCODONE HYDROCHLORIDE 10 MG: 10 TABLET ORAL at 13:27

## 2025-08-09 RX ADMIN — TIZANIDINE 2 MG: 2 TABLET ORAL at 09:10

## 2025-08-09 RX ADMIN — TIZANIDINE 4 MG: 4 TABLET ORAL at 18:41

## 2025-08-09 RX ADMIN — Medication 1 PACKET: at 20:36

## 2025-08-09 RX ADMIN — HYDROMORPHONE HYDROCHLORIDE 0.4 MG: 0.2 INJECTION, SOLUTION INTRAMUSCULAR; INTRAVENOUS; SUBCUTANEOUS at 02:15

## 2025-08-09 RX ADMIN — POLYETHYLENE GLYCOL 3350 17 G: 17 POWDER, FOR SOLUTION ORAL at 20:52

## 2025-08-09 RX ADMIN — OXYCODONE HYDROCHLORIDE 10 MG: 10 TABLET ORAL at 08:56

## 2025-08-09 RX ADMIN — NALOXEGOL OXALATE 12.5 MG: 12.5 TABLET, FILM COATED ORAL at 08:56

## 2025-08-09 RX ADMIN — DULOXETINE HYDROCHLORIDE 60 MG: 60 CAPSULE, DELAYED RELEASE PELLETS ORAL at 08:56

## 2025-08-09 RX ADMIN — PREGABALIN 75 MG: 75 CAPSULE ORAL at 20:34

## 2025-08-09 RX ADMIN — OXYCODONE HYDROCHLORIDE 10 MG: 10 TABLET ORAL at 20:34

## 2025-08-09 ASSESSMENT — ACTIVITIES OF DAILY LIVING (ADL)
ADLS_ACUITY_SCORE: 43

## 2025-08-10 ENCOUNTER — APPOINTMENT (OUTPATIENT)
Dept: PHYSICAL THERAPY | Facility: CLINIC | Age: 75
DRG: 519 | End: 2025-08-10
Attending: ORTHOPAEDIC SURGERY
Payer: MEDICARE

## 2025-08-10 ENCOUNTER — APPOINTMENT (OUTPATIENT)
Dept: OCCUPATIONAL THERAPY | Facility: CLINIC | Age: 75
DRG: 519 | End: 2025-08-10
Attending: STUDENT IN AN ORGANIZED HEALTH CARE EDUCATION/TRAINING PROGRAM
Payer: MEDICARE

## 2025-08-10 PROCEDURE — 250N000013 HC RX MED GY IP 250 OP 250 PS 637: Performed by: STUDENT IN AN ORGANIZED HEALTH CARE EDUCATION/TRAINING PROGRAM

## 2025-08-10 PROCEDURE — 250N000013 HC RX MED GY IP 250 OP 250 PS 637: Performed by: PHYSICIAN ASSISTANT

## 2025-08-10 PROCEDURE — 250N000013 HC RX MED GY IP 250 OP 250 PS 637: Performed by: SURGERY

## 2025-08-10 PROCEDURE — 258N000003 HC RX IP 258 OP 636: Performed by: STUDENT IN AN ORGANIZED HEALTH CARE EDUCATION/TRAINING PROGRAM

## 2025-08-10 PROCEDURE — 97530 THERAPEUTIC ACTIVITIES: CPT | Mod: GP | Performed by: PHYSICAL THERAPIST

## 2025-08-10 PROCEDURE — 97165 OT EVAL LOW COMPLEX 30 MIN: CPT | Mod: GO

## 2025-08-10 PROCEDURE — 250N000013 HC RX MED GY IP 250 OP 250 PS 637: Performed by: ORTHOPAEDIC SURGERY

## 2025-08-10 PROCEDURE — 250N000011 HC RX IP 250 OP 636: Performed by: NURSE PRACTITIONER

## 2025-08-10 PROCEDURE — 97110 THERAPEUTIC EXERCISES: CPT | Mod: GP | Performed by: PHYSICAL THERAPIST

## 2025-08-10 PROCEDURE — 250N000009 HC RX 250: Performed by: NURSE PRACTITIONER

## 2025-08-10 PROCEDURE — 97530 THERAPEUTIC ACTIVITIES: CPT | Mod: GO

## 2025-08-10 PROCEDURE — 120N000002 HC R&B MED SURG/OB UMMC

## 2025-08-10 PROCEDURE — 97535 SELF CARE MNGMENT TRAINING: CPT | Mod: GO

## 2025-08-10 PROCEDURE — 258N000003 HC RX IP 258 OP 636: Performed by: NURSE PRACTITIONER

## 2025-08-10 PROCEDURE — 99232 SBSQ HOSP IP/OBS MODERATE 35: CPT | Performed by: INTERNAL MEDICINE

## 2025-08-10 PROCEDURE — 99232 SBSQ HOSP IP/OBS MODERATE 35: CPT | Performed by: ANESTHESIOLOGY

## 2025-08-10 RX ORDER — LIDOCAINE 4 G/G
1 PATCH TOPICAL
Status: DISCONTINUED | OUTPATIENT
Start: 2025-08-10 | End: 2025-08-11

## 2025-08-10 RX ORDER — LIDOCAINE 4 G/G
1 PATCH TOPICAL
Status: DISCONTINUED | OUTPATIENT
Start: 2025-08-11 | End: 2025-08-10

## 2025-08-10 RX ADMIN — ACETAMINOPHEN 975 MG: 325 TABLET ORAL at 15:41

## 2025-08-10 RX ADMIN — LEVOTHYROXINE SODIUM 150 MCG: 0.15 TABLET ORAL at 08:47

## 2025-08-10 RX ADMIN — TIZANIDINE 4 MG: 4 TABLET ORAL at 06:08

## 2025-08-10 RX ADMIN — ACETAMINOPHEN 975 MG: 325 TABLET ORAL at 08:47

## 2025-08-10 RX ADMIN — ATORVASTATIN CALCIUM 40 MG: 40 TABLET, FILM COATED ORAL at 21:16

## 2025-08-10 RX ADMIN — OXYCODONE HYDROCHLORIDE 10 MG: 10 TABLET ORAL at 23:46

## 2025-08-10 RX ADMIN — PREGABALIN 75 MG: 75 CAPSULE ORAL at 08:47

## 2025-08-10 RX ADMIN — HYDROMORPHONE HYDROCHLORIDE 0.4 MG: 0.2 INJECTION, SOLUTION INTRAMUSCULAR; INTRAVENOUS; SUBCUTANEOUS at 09:09

## 2025-08-10 RX ADMIN — HYDROMORPHONE HYDROCHLORIDE 0.4 MG: 0.2 INJECTION, SOLUTION INTRAMUSCULAR; INTRAVENOUS; SUBCUTANEOUS at 01:02

## 2025-08-10 RX ADMIN — DULOXETINE HYDROCHLORIDE 60 MG: 60 CAPSULE, DELAYED RELEASE PELLETS ORAL at 08:47

## 2025-08-10 RX ADMIN — POLYETHYLENE GLYCOL 3350 17 G: 17 POWDER, FOR SOLUTION ORAL at 08:47

## 2025-08-10 RX ADMIN — HYDROMORPHONE HYDROCHLORIDE 0.4 MG: 0.2 INJECTION, SOLUTION INTRAMUSCULAR; INTRAVENOUS; SUBCUTANEOUS at 19:07

## 2025-08-10 RX ADMIN — KETAMINE HYDROCHLORIDE 5 MG/HR: 10 INJECTION INTRAMUSCULAR; INTRAVENOUS at 01:12

## 2025-08-10 RX ADMIN — HYDROMORPHONE HYDROCHLORIDE 0.4 MG: 0.2 INJECTION, SOLUTION INTRAMUSCULAR; INTRAVENOUS; SUBCUTANEOUS at 21:16

## 2025-08-10 RX ADMIN — ACETAMINOPHEN 975 MG: 325 TABLET ORAL at 23:45

## 2025-08-10 RX ADMIN — HYDROMORPHONE HYDROCHLORIDE 0.4 MG: 0.2 INJECTION, SOLUTION INTRAMUSCULAR; INTRAVENOUS; SUBCUTANEOUS at 14:04

## 2025-08-10 RX ADMIN — OXYCODONE HYDROCHLORIDE 10 MG: 10 TABLET ORAL at 15:41

## 2025-08-10 RX ADMIN — OXYCODONE HYDROCHLORIDE 10 MG: 10 TABLET ORAL at 08:47

## 2025-08-10 RX ADMIN — NALOXEGOL OXALATE 12.5 MG: 12.5 TABLET, FILM COATED ORAL at 08:47

## 2025-08-10 RX ADMIN — OXYCODONE HYDROCHLORIDE 10 MG: 10 TABLET ORAL at 06:08

## 2025-08-10 RX ADMIN — LIDOCAINE 1 PATCH: 4 PATCH TOPICAL at 19:44

## 2025-08-10 RX ADMIN — TIZANIDINE 4 MG: 4 TABLET ORAL at 13:24

## 2025-08-10 RX ADMIN — MAGNESIUM HYDROXIDE 30 ML: 400 SUSPENSION ORAL at 12:48

## 2025-08-10 RX ADMIN — Medication 1 PACKET: at 08:48

## 2025-08-10 RX ADMIN — PREGABALIN 75 MG: 75 CAPSULE ORAL at 19:42

## 2025-08-10 RX ADMIN — OXYCODONE HYDROCHLORIDE 10 MG: 10 TABLET ORAL at 12:48

## 2025-08-10 RX ADMIN — SENNOSIDES, DOCUSATE SODIUM 1 TABLET: 50; 8.6 TABLET, FILM COATED ORAL at 08:47

## 2025-08-10 RX ADMIN — OXYCODONE HYDROCHLORIDE 10 MG: 10 TABLET ORAL at 19:59

## 2025-08-10 RX ADMIN — SODIUM CHLORIDE: 0.9 INJECTION, SOLUTION INTRAVENOUS at 06:08

## 2025-08-10 RX ADMIN — TIZANIDINE 4 MG: 4 TABLET ORAL at 19:43

## 2025-08-10 ASSESSMENT — ACTIVITIES OF DAILY LIVING (ADL)
ADLS_ACUITY_SCORE: 43
ADLS_ACUITY_SCORE: 46
ADLS_ACUITY_SCORE: 47
ADLS_ACUITY_SCORE: 43
ADLS_ACUITY_SCORE: 43
ADLS_ACUITY_SCORE: 46
ADLS_ACUITY_SCORE: 43
ADLS_ACUITY_SCORE: 46
ADLS_ACUITY_SCORE: 43
ADLS_ACUITY_SCORE: 46
ADLS_ACUITY_SCORE: 47
ADLS_ACUITY_SCORE: 47
ADLS_ACUITY_SCORE: 46
ADLS_ACUITY_SCORE: 47
ADLS_ACUITY_SCORE: 43
ADLS_ACUITY_SCORE: 43
ADLS_ACUITY_SCORE: 47
ADLS_ACUITY_SCORE: 46

## 2025-08-11 ENCOUNTER — APPOINTMENT (OUTPATIENT)
Dept: PHYSICAL THERAPY | Facility: CLINIC | Age: 75
DRG: 519 | End: 2025-08-11
Attending: ORTHOPAEDIC SURGERY
Payer: MEDICARE

## 2025-08-11 LAB
GLUCOSE BLDC GLUCOMTR-MCNC: 115 MG/DL (ref 70–99)
LACTATE SERPL-SCNC: 1.7 MMOL/L (ref 0.7–2)

## 2025-08-11 PROCEDURE — 258N000003 HC RX IP 258 OP 636: Performed by: NURSE PRACTITIONER

## 2025-08-11 PROCEDURE — 250N000011 HC RX IP 250 OP 636: Performed by: PHYSICIAN ASSISTANT

## 2025-08-11 PROCEDURE — 83605 ASSAY OF LACTIC ACID: CPT | Performed by: INTERNAL MEDICINE

## 2025-08-11 PROCEDURE — 250N000013 HC RX MED GY IP 250 OP 250 PS 637: Performed by: PHYSICIAN ASSISTANT

## 2025-08-11 PROCEDURE — 250N000013 HC RX MED GY IP 250 OP 250 PS 637: Performed by: ORTHOPAEDIC SURGERY

## 2025-08-11 PROCEDURE — 250N000011 HC RX IP 250 OP 636: Performed by: NURSE PRACTITIONER

## 2025-08-11 PROCEDURE — 99232 SBSQ HOSP IP/OBS MODERATE 35: CPT | Performed by: INTERNAL MEDICINE

## 2025-08-11 PROCEDURE — 97530 THERAPEUTIC ACTIVITIES: CPT | Mod: GP

## 2025-08-11 PROCEDURE — 250N000009 HC RX 250: Performed by: NURSE PRACTITIONER

## 2025-08-11 PROCEDURE — 36415 COLL VENOUS BLD VENIPUNCTURE: CPT | Performed by: INTERNAL MEDICINE

## 2025-08-11 PROCEDURE — 250N000013 HC RX MED GY IP 250 OP 250 PS 637: Performed by: STUDENT IN AN ORGANIZED HEALTH CARE EDUCATION/TRAINING PROGRAM

## 2025-08-11 PROCEDURE — 250N000013 HC RX MED GY IP 250 OP 250 PS 637: Performed by: SURGERY

## 2025-08-11 PROCEDURE — 120N000002 HC R&B MED SURG/OB UMMC

## 2025-08-11 PROCEDURE — 999N000127 HC STATISTIC PERIPHERAL IV START W US GUIDANCE

## 2025-08-11 RX ORDER — OXYCODONE HYDROCHLORIDE 15 MG/1
15 TABLET ORAL
Refills: 0 | Status: DISCONTINUED | OUTPATIENT
Start: 2025-08-11 | End: 2025-08-13 | Stop reason: HOSPADM

## 2025-08-11 RX ORDER — LIDOCAINE 4 G/G
3 PATCH TOPICAL
Status: DISCONTINUED | OUTPATIENT
Start: 2025-08-11 | End: 2025-08-13 | Stop reason: HOSPADM

## 2025-08-11 RX ORDER — DIAZEPAM 2 MG/1
2 TABLET ORAL EVERY 6 HOURS PRN
Status: DISCONTINUED | OUTPATIENT
Start: 2025-08-11 | End: 2025-08-13 | Stop reason: HOSPADM

## 2025-08-11 RX ORDER — OXYCODONE HYDROCHLORIDE 10 MG/1
10 TABLET ORAL
Refills: 0 | Status: DISCONTINUED | OUTPATIENT
Start: 2025-08-11 | End: 2025-08-13 | Stop reason: HOSPADM

## 2025-08-11 RX ORDER — DEXAMETHASONE SODIUM PHOSPHATE 4 MG/ML
4 INJECTION, SOLUTION INTRA-ARTICULAR; INTRALESIONAL; INTRAMUSCULAR; INTRAVENOUS; SOFT TISSUE EVERY 8 HOURS
Status: DISCONTINUED | OUTPATIENT
Start: 2025-08-11 | End: 2025-08-13

## 2025-08-11 RX ORDER — PREGABALIN 100 MG/1
100 CAPSULE ORAL 2 TIMES DAILY
Status: DISCONTINUED | OUTPATIENT
Start: 2025-08-11 | End: 2025-08-12

## 2025-08-11 RX ORDER — METHOCARBAMOL 500 MG/1
500 TABLET, FILM COATED ORAL 4 TIMES DAILY
Status: DISCONTINUED | OUTPATIENT
Start: 2025-08-11 | End: 2025-08-13

## 2025-08-11 RX ADMIN — METHOCARBAMOL 500 MG: 500 TABLET ORAL at 21:13

## 2025-08-11 RX ADMIN — DEXAMETHASONE SODIUM PHOSPHATE 4 MG: 4 INJECTION, SOLUTION INTRAMUSCULAR; INTRAVENOUS at 14:23

## 2025-08-11 RX ADMIN — METHOCARBAMOL 500 MG: 500 TABLET ORAL at 16:52

## 2025-08-11 RX ADMIN — HYDROMORPHONE HYDROCHLORIDE 0.4 MG: 0.2 INJECTION, SOLUTION INTRAMUSCULAR; INTRAVENOUS; SUBCUTANEOUS at 03:23

## 2025-08-11 RX ADMIN — DIAZEPAM 2 MG: 2 TABLET ORAL at 22:02

## 2025-08-11 RX ADMIN — HYDROMORPHONE HYDROCHLORIDE 0.4 MG: 0.2 INJECTION, SOLUTION INTRAMUSCULAR; INTRAVENOUS; SUBCUTANEOUS at 13:13

## 2025-08-11 RX ADMIN — ACETAMINOPHEN 975 MG: 325 TABLET ORAL at 16:52

## 2025-08-11 RX ADMIN — LEVOTHYROXINE SODIUM 150 MCG: 0.15 TABLET ORAL at 08:09

## 2025-08-11 RX ADMIN — ACETAMINOPHEN 975 MG: 325 TABLET ORAL at 08:08

## 2025-08-11 RX ADMIN — OXYCODONE HYDROCHLORIDE 10 MG: 10 TABLET ORAL at 12:08

## 2025-08-11 RX ADMIN — TIZANIDINE 4 MG: 4 TABLET ORAL at 08:09

## 2025-08-11 RX ADMIN — TIZANIDINE 4 MG: 4 TABLET ORAL at 13:09

## 2025-08-11 RX ADMIN — OXYCODONE HYDROCHLORIDE 15 MG: 15 TABLET ORAL at 23:52

## 2025-08-11 RX ADMIN — DULOXETINE HYDROCHLORIDE 60 MG: 60 CAPSULE, DELAYED RELEASE PELLETS ORAL at 08:09

## 2025-08-11 RX ADMIN — Medication 1 PACKET: at 08:09

## 2025-08-11 RX ADMIN — KETAMINE HYDROCHLORIDE 5 MG/HR: 10 INJECTION INTRAMUSCULAR; INTRAVENOUS at 20:34

## 2025-08-11 RX ADMIN — KETAMINE HYDROCHLORIDE 5 MG/HR: 10 INJECTION INTRAMUSCULAR; INTRAVENOUS at 00:30

## 2025-08-11 RX ADMIN — OXYCODONE HYDROCHLORIDE 10 MG: 10 TABLET ORAL at 08:09

## 2025-08-11 RX ADMIN — OXYCODONE HYDROCHLORIDE 15 MG: 15 TABLET ORAL at 19:52

## 2025-08-11 RX ADMIN — OXYCODONE HYDROCHLORIDE 10 MG: 10 TABLET ORAL at 04:37

## 2025-08-11 RX ADMIN — ACETAMINOPHEN 975 MG: 325 TABLET ORAL at 23:52

## 2025-08-11 RX ADMIN — Medication 1 PACKET: at 20:02

## 2025-08-11 RX ADMIN — MAGNESIUM HYDROXIDE 30 ML: 400 SUSPENSION ORAL at 08:09

## 2025-08-11 RX ADMIN — BISACODYL 10 MG: 10 SUPPOSITORY RECTAL at 12:16

## 2025-08-11 RX ADMIN — LIDOCAINE 3 PATCH: 4 PATCH TOPICAL at 19:52

## 2025-08-11 RX ADMIN — DEXAMETHASONE SODIUM PHOSPHATE 4 MG: 4 INJECTION, SOLUTION INTRAMUSCULAR; INTRAVENOUS at 21:13

## 2025-08-11 RX ADMIN — PREGABALIN 75 MG: 75 CAPSULE ORAL at 08:09

## 2025-08-11 RX ADMIN — PREGABALIN 100 MG: 100 CAPSULE ORAL at 19:52

## 2025-08-11 RX ADMIN — POLYETHYLENE GLYCOL 3350 17 G: 17 POWDER, FOR SOLUTION ORAL at 12:08

## 2025-08-11 RX ADMIN — NALOXEGOL OXALATE 12.5 MG: 12.5 TABLET, FILM COATED ORAL at 08:09

## 2025-08-11 RX ADMIN — ATORVASTATIN CALCIUM 40 MG: 40 TABLET, FILM COATED ORAL at 21:13

## 2025-08-11 ASSESSMENT — ACTIVITIES OF DAILY LIVING (ADL)
ADLS_ACUITY_SCORE: 49
ADLS_ACUITY_SCORE: 49
ADLS_ACUITY_SCORE: 47
ADLS_ACUITY_SCORE: 51
ADLS_ACUITY_SCORE: 49
ADLS_ACUITY_SCORE: 47
ADLS_ACUITY_SCORE: 49
ADLS_ACUITY_SCORE: 47
ADLS_ACUITY_SCORE: 51
ADLS_ACUITY_SCORE: 49
ADLS_ACUITY_SCORE: 47
ADLS_ACUITY_SCORE: 49
ADLS_ACUITY_SCORE: 49
ADLS_ACUITY_SCORE: 51
ADLS_ACUITY_SCORE: 51

## 2025-08-12 LAB
ANION GAP SERPL CALCULATED.3IONS-SCNC: 12 MMOL/L (ref 7–15)
BUN SERPL-MCNC: 27.1 MG/DL (ref 8–23)
CALCIUM SERPL-MCNC: 9.2 MG/DL (ref 8.8–10.4)
CHLORIDE SERPL-SCNC: 99 MMOL/L (ref 98–107)
CREAT SERPL-MCNC: 1.06 MG/DL (ref 0.51–0.95)
EGFRCR SERPLBLD CKD-EPI 2021: 55 ML/MIN/1.73M2
ERYTHROCYTE [DISTWIDTH] IN BLOOD BY AUTOMATED COUNT: 14.8 % (ref 10–15)
GLUCOSE BLDC GLUCOMTR-MCNC: 113 MG/DL (ref 70–99)
GLUCOSE BLDC GLUCOMTR-MCNC: 134 MG/DL (ref 70–99)
GLUCOSE SERPL-MCNC: 133 MG/DL (ref 70–99)
HCO3 SERPL-SCNC: 26 MMOL/L (ref 22–29)
HCT VFR BLD AUTO: 36.3 % (ref 35–47)
HGB BLD-MCNC: 11.7 G/DL (ref 11.7–15.7)
MAGNESIUM SERPL-MCNC: 2.3 MG/DL (ref 1.7–2.3)
MCH RBC QN AUTO: 28.2 PG (ref 26.5–33)
MCHC RBC AUTO-ENTMCNC: 32.2 G/DL (ref 31.5–36.5)
MCV RBC AUTO: 88 FL (ref 78–100)
PLATELET # BLD AUTO: 188 10E3/UL (ref 150–450)
POTASSIUM SERPL-SCNC: 4.8 MMOL/L (ref 3.4–5.3)
RBC # BLD AUTO: 4.15 10E6/UL (ref 3.8–5.2)
SODIUM SERPL-SCNC: 137 MMOL/L (ref 135–145)
WBC # BLD AUTO: 11.1 10E3/UL (ref 4–11)

## 2025-08-12 PROCEDURE — 120N000002 HC R&B MED SURG/OB UMMC

## 2025-08-12 PROCEDURE — 999N000127 HC STATISTIC PERIPHERAL IV START W US GUIDANCE

## 2025-08-12 PROCEDURE — 85027 COMPLETE CBC AUTOMATED: CPT | Performed by: INTERNAL MEDICINE

## 2025-08-12 PROCEDURE — 250N000013 HC RX MED GY IP 250 OP 250 PS 637: Performed by: STUDENT IN AN ORGANIZED HEALTH CARE EDUCATION/TRAINING PROGRAM

## 2025-08-12 PROCEDURE — 250N000013 HC RX MED GY IP 250 OP 250 PS 637: Performed by: NURSE PRACTITIONER

## 2025-08-12 PROCEDURE — 250N000011 HC RX IP 250 OP 636: Performed by: PHYSICIAN ASSISTANT

## 2025-08-12 PROCEDURE — 82310 ASSAY OF CALCIUM: CPT | Performed by: INTERNAL MEDICINE

## 2025-08-12 PROCEDURE — 250N000013 HC RX MED GY IP 250 OP 250 PS 637: Performed by: PHYSICIAN ASSISTANT

## 2025-08-12 PROCEDURE — 999N000285 HC STATISTIC VASC ACCESS LAB DRAW WITH PIV START

## 2025-08-12 PROCEDURE — 97530 THERAPEUTIC ACTIVITIES: CPT | Mod: GP

## 2025-08-12 PROCEDURE — 83735 ASSAY OF MAGNESIUM: CPT | Performed by: INTERNAL MEDICINE

## 2025-08-12 PROCEDURE — 97116 GAIT TRAINING THERAPY: CPT | Mod: GP

## 2025-08-12 RX ORDER — PREGABALIN 100 MG/1
100 CAPSULE ORAL 3 TIMES DAILY
Status: DISCONTINUED | OUTPATIENT
Start: 2025-08-12 | End: 2025-08-13 | Stop reason: HOSPADM

## 2025-08-12 RX ADMIN — ACETAMINOPHEN 975 MG: 325 TABLET ORAL at 16:23

## 2025-08-12 RX ADMIN — OXYCODONE HYDROCHLORIDE 10 MG: 10 TABLET ORAL at 13:03

## 2025-08-12 RX ADMIN — NALOXEGOL OXALATE 12.5 MG: 12.5 TABLET, FILM COATED ORAL at 08:18

## 2025-08-12 RX ADMIN — METHOCARBAMOL 500 MG: 500 TABLET ORAL at 14:53

## 2025-08-12 RX ADMIN — ACETAMINOPHEN 975 MG: 325 TABLET ORAL at 08:17

## 2025-08-12 RX ADMIN — OXYCODONE HYDROCHLORIDE 15 MG: 15 TABLET ORAL at 06:46

## 2025-08-12 RX ADMIN — ACETAMINOPHEN 975 MG: 325 TABLET ORAL at 22:53

## 2025-08-12 RX ADMIN — METHOCARBAMOL 500 MG: 500 TABLET ORAL at 08:18

## 2025-08-12 RX ADMIN — OXYCODONE HYDROCHLORIDE 10 MG: 10 TABLET ORAL at 02:55

## 2025-08-12 RX ADMIN — MAGNESIUM HYDROXIDE 30 ML: 400 SUSPENSION ORAL at 08:18

## 2025-08-12 RX ADMIN — METHOCARBAMOL 500 MG: 500 TABLET ORAL at 19:29

## 2025-08-12 RX ADMIN — DEXAMETHASONE SODIUM PHOSPHATE 4 MG: 4 INJECTION, SOLUTION INTRAMUSCULAR; INTRAVENOUS at 22:54

## 2025-08-12 RX ADMIN — OXYCODONE HYDROCHLORIDE 15 MG: 15 TABLET ORAL at 09:47

## 2025-08-12 RX ADMIN — OXYCODONE HYDROCHLORIDE 10 MG: 10 TABLET ORAL at 16:24

## 2025-08-12 RX ADMIN — ATORVASTATIN CALCIUM 40 MG: 40 TABLET, FILM COATED ORAL at 22:54

## 2025-08-12 RX ADMIN — PREGABALIN 100 MG: 100 CAPSULE ORAL at 14:53

## 2025-08-12 RX ADMIN — OXYCODONE HYDROCHLORIDE 10 MG: 10 TABLET ORAL at 22:54

## 2025-08-12 RX ADMIN — OXYCODONE HYDROCHLORIDE 10 MG: 10 TABLET ORAL at 19:28

## 2025-08-12 RX ADMIN — DULOXETINE HYDROCHLORIDE 60 MG: 60 CAPSULE, DELAYED RELEASE PELLETS ORAL at 08:18

## 2025-08-12 RX ADMIN — DEXAMETHASONE SODIUM PHOSPHATE 4 MG: 4 INJECTION, SOLUTION INTRAMUSCULAR; INTRAVENOUS at 08:07

## 2025-08-12 RX ADMIN — DEXAMETHASONE SODIUM PHOSPHATE 4 MG: 4 INJECTION, SOLUTION INTRAMUSCULAR; INTRAVENOUS at 16:24

## 2025-08-12 RX ADMIN — PREGABALIN 100 MG: 100 CAPSULE ORAL at 08:17

## 2025-08-12 RX ADMIN — PREGABALIN 100 MG: 100 CAPSULE ORAL at 19:29

## 2025-08-12 RX ADMIN — Medication 1 PACKET: at 08:18

## 2025-08-12 RX ADMIN — LEVOTHYROXINE SODIUM 150 MCG: 0.15 TABLET ORAL at 08:17

## 2025-08-12 ASSESSMENT — ACTIVITIES OF DAILY LIVING (ADL)
ADLS_ACUITY_SCORE: 49
ADLS_ACUITY_SCORE: 49
ADLS_ACUITY_SCORE: 57
ADLS_ACUITY_SCORE: 57
ADLS_ACUITY_SCORE: 49
ADLS_ACUITY_SCORE: 58
ADLS_ACUITY_SCORE: 58
DEPENDENT_IADLS:: LAUNDRY
ADLS_ACUITY_SCORE: 49
ADLS_ACUITY_SCORE: 58
ADLS_ACUITY_SCORE: 49
ADLS_ACUITY_SCORE: 49
ADLS_ACUITY_SCORE: 57
ADLS_ACUITY_SCORE: 58
ADLS_ACUITY_SCORE: 49
ADLS_ACUITY_SCORE: 58
ADLS_ACUITY_SCORE: 49
ADLS_ACUITY_SCORE: 58
ADLS_ACUITY_SCORE: 49
ADLS_ACUITY_SCORE: 57
ADLS_ACUITY_SCORE: 58
ADLS_ACUITY_SCORE: 58
ADLS_ACUITY_SCORE: 49
ADLS_ACUITY_SCORE: 49

## 2025-08-13 VITALS
TEMPERATURE: 97.9 F | HEART RATE: 72 BPM | OXYGEN SATURATION: 95 % | BODY MASS INDEX: 36.58 KG/M2 | SYSTOLIC BLOOD PRESSURE: 94 MMHG | WEIGHT: 219.58 LBS | DIASTOLIC BLOOD PRESSURE: 65 MMHG | RESPIRATION RATE: 15 BRPM | HEIGHT: 65 IN

## 2025-08-13 LAB
GLUCOSE BLDC GLUCOMTR-MCNC: 110 MG/DL (ref 70–99)
GLUCOSE BLDC GLUCOMTR-MCNC: 150 MG/DL (ref 70–99)

## 2025-08-13 PROCEDURE — 97535 SELF CARE MNGMENT TRAINING: CPT | Mod: GO | Performed by: OCCUPATIONAL THERAPIST

## 2025-08-13 PROCEDURE — 250N000013 HC RX MED GY IP 250 OP 250 PS 637: Performed by: STUDENT IN AN ORGANIZED HEALTH CARE EDUCATION/TRAINING PROGRAM

## 2025-08-13 PROCEDURE — 97530 THERAPEUTIC ACTIVITIES: CPT | Mod: GP | Performed by: PHYSICAL THERAPIST

## 2025-08-13 PROCEDURE — 97530 THERAPEUTIC ACTIVITIES: CPT | Mod: GO | Performed by: OCCUPATIONAL THERAPIST

## 2025-08-13 PROCEDURE — 250N000011 HC RX IP 250 OP 636: Performed by: STUDENT IN AN ORGANIZED HEALTH CARE EDUCATION/TRAINING PROGRAM

## 2025-08-13 PROCEDURE — 97116 GAIT TRAINING THERAPY: CPT | Mod: GP | Performed by: PHYSICAL THERAPIST

## 2025-08-13 PROCEDURE — 250N000013 HC RX MED GY IP 250 OP 250 PS 637: Performed by: PHYSICIAN ASSISTANT

## 2025-08-13 PROCEDURE — 250N000013 HC RX MED GY IP 250 OP 250 PS 637: Performed by: NURSE PRACTITIONER

## 2025-08-13 RX ORDER — AMOXICILLIN 250 MG
1 CAPSULE ORAL 2 TIMES DAILY PRN
Qty: 25 TABLET | Refills: 0 | Status: SHIPPED | OUTPATIENT
Start: 2025-08-13

## 2025-08-13 RX ORDER — LIDOCAINE 4 G/G
3 PATCH TOPICAL EVERY 24 HOURS
Qty: 25 PATCH | Refills: 0 | Status: SHIPPED | OUTPATIENT
Start: 2025-08-13

## 2025-08-13 RX ORDER — METHOCARBAMOL 750 MG/1
750 TABLET, FILM COATED ORAL 3 TIMES DAILY
Qty: 35 TABLET | Refills: 0 | Status: SHIPPED | OUTPATIENT
Start: 2025-08-13

## 2025-08-13 RX ORDER — PREGABALIN 100 MG/1
100 CAPSULE ORAL 3 TIMES DAILY
Qty: 60 CAPSULE | Refills: 0 | Status: SHIPPED | OUTPATIENT
Start: 2025-08-13 | End: 2025-09-02

## 2025-08-13 RX ORDER — DEXAMETHASONE SODIUM PHOSPHATE 4 MG/ML
4 INJECTION, SOLUTION INTRA-ARTICULAR; INTRALESIONAL; INTRAMUSCULAR; INTRAVENOUS; SOFT TISSUE EVERY 12 HOURS
Status: DISCONTINUED | OUTPATIENT
Start: 2025-08-16 | End: 2025-08-13 | Stop reason: HOSPADM

## 2025-08-13 RX ORDER — ARGININE/GLUTAMINE/CALCIUM BMB 7G-7G-1.5G
1 POWDER IN PACKET (EA) ORAL 2 TIMES DAILY
Qty: 24 PACKET | Refills: 0 | Status: SHIPPED | OUTPATIENT
Start: 2025-08-13 | End: 2025-08-25

## 2025-08-13 RX ORDER — POLYETHYLENE GLYCOL 3350 17 G/17G
17 POWDER, FOR SOLUTION ORAL DAILY
Qty: 510 G | Refills: 0 | Status: SHIPPED | OUTPATIENT
Start: 2025-08-13

## 2025-08-13 RX ORDER — METHOCARBAMOL 750 MG/1
750 TABLET, FILM COATED ORAL 3 TIMES DAILY
Status: DISCONTINUED | OUTPATIENT
Start: 2025-08-13 | End: 2025-08-13 | Stop reason: HOSPADM

## 2025-08-13 RX ORDER — OXYCODONE HYDROCHLORIDE 5 MG/1
5-10 TABLET ORAL EVERY 6 HOURS PRN
Qty: 35 TABLET | Refills: 0 | Status: SHIPPED | OUTPATIENT
Start: 2025-08-13 | End: 2025-08-14

## 2025-08-13 RX ORDER — DEXAMETHASONE SODIUM PHOSPHATE 4 MG/ML
4 INJECTION, SOLUTION INTRA-ARTICULAR; INTRALESIONAL; INTRAMUSCULAR; INTRAVENOUS; SOFT TISSUE EVERY 8 HOURS
Status: DISCONTINUED | OUTPATIENT
Start: 2025-08-13 | End: 2025-08-13 | Stop reason: HOSPADM

## 2025-08-13 RX ORDER — DEXAMETHASONE SODIUM PHOSPHATE 4 MG/ML
4 INJECTION, SOLUTION INTRA-ARTICULAR; INTRALESIONAL; INTRAMUSCULAR; INTRAVENOUS; SOFT TISSUE EVERY 24 HOURS
Status: DISCONTINUED | OUTPATIENT
Start: 2025-08-18 | End: 2025-08-13 | Stop reason: HOSPADM

## 2025-08-13 RX ORDER — FAMOTIDINE 20 MG/1
20 TABLET, FILM COATED ORAL 2 TIMES DAILY
Status: DISCONTINUED | OUTPATIENT
Start: 2025-08-13 | End: 2025-08-13 | Stop reason: HOSPADM

## 2025-08-13 RX ADMIN — DULOXETINE HYDROCHLORIDE 60 MG: 60 CAPSULE, DELAYED RELEASE PELLETS ORAL at 08:36

## 2025-08-13 RX ADMIN — METHOCARBAMOL 750 MG: 750 TABLET ORAL at 14:01

## 2025-08-13 RX ADMIN — OXYCODONE HYDROCHLORIDE 10 MG: 10 TABLET ORAL at 07:28

## 2025-08-13 RX ADMIN — OXYCODONE HYDROCHLORIDE 10 MG: 10 TABLET ORAL at 03:46

## 2025-08-13 RX ADMIN — LEVOTHYROXINE SODIUM 150 MCG: 0.15 TABLET ORAL at 08:36

## 2025-08-13 RX ADMIN — DEXAMETHASONE SODIUM PHOSPHATE 4 MG: 4 INJECTION, SOLUTION INTRAMUSCULAR; INTRAVENOUS at 15:29

## 2025-08-13 RX ADMIN — ACETAMINOPHEN 975 MG: 325 TABLET ORAL at 07:28

## 2025-08-13 RX ADMIN — OXYCODONE HYDROCHLORIDE 10 MG: 10 TABLET ORAL at 11:40

## 2025-08-13 RX ADMIN — ACETAMINOPHEN 975 MG: 325 TABLET ORAL at 15:29

## 2025-08-13 RX ADMIN — PREGABALIN 100 MG: 100 CAPSULE ORAL at 14:01

## 2025-08-13 RX ADMIN — METHOCARBAMOL 500 MG: 500 TABLET ORAL at 03:46

## 2025-08-13 RX ADMIN — PREGABALIN 100 MG: 100 CAPSULE ORAL at 08:36

## 2025-08-13 RX ADMIN — DEXAMETHASONE SODIUM PHOSPHATE 4 MG: 4 INJECTION, SOLUTION INTRAMUSCULAR; INTRAVENOUS at 08:36

## 2025-08-13 RX ADMIN — Medication 1 PACKET: at 08:36

## 2025-08-13 RX ADMIN — NALOXEGOL OXALATE 12.5 MG: 12.5 TABLET, FILM COATED ORAL at 08:36

## 2025-08-13 RX ADMIN — METHOCARBAMOL 500 MG: 500 TABLET ORAL at 08:36

## 2025-08-13 RX ADMIN — FAMOTIDINE 20 MG: 20 TABLET, FILM COATED ORAL at 08:36

## 2025-08-13 RX ADMIN — OXYCODONE HYDROCHLORIDE 10 MG: 10 TABLET ORAL at 15:29

## 2025-08-13 ASSESSMENT — ACTIVITIES OF DAILY LIVING (ADL)
ADLS_ACUITY_SCORE: 57
ADLS_ACUITY_SCORE: 54
ADLS_ACUITY_SCORE: 57
ADLS_ACUITY_SCORE: 54
ADLS_ACUITY_SCORE: 57
ADLS_ACUITY_SCORE: 54
ADLS_ACUITY_SCORE: 57
ADLS_ACUITY_SCORE: 54
ADLS_ACUITY_SCORE: 54
ADLS_ACUITY_SCORE: 57
ADLS_ACUITY_SCORE: 57

## 2025-08-14 DIAGNOSIS — M48.062 SPINAL STENOSIS OF LUMBAR REGION WITH NEUROGENIC CLAUDICATION: ICD-10-CM

## 2025-08-14 DIAGNOSIS — M48.062 LUMBAR STENOSIS WITH NEUROGENIC CLAUDICATION: ICD-10-CM

## 2025-08-14 RX ORDER — OXYCODONE HYDROCHLORIDE 5 MG/1
5-10 TABLET ORAL
Qty: 63 TABLET | Refills: 0 | Status: SHIPPED | OUTPATIENT
Start: 2025-08-14

## 2025-08-25 ENCOUNTER — TELEPHONE (OUTPATIENT)
Dept: ORTHOPEDICS | Facility: CLINIC | Age: 75
End: 2025-08-25
Payer: MEDICARE

## 2025-08-25 ENCOUNTER — MYC MEDICAL ADVICE (OUTPATIENT)
Dept: ORTHOPEDICS | Facility: CLINIC | Age: 75
End: 2025-08-25
Payer: MEDICARE

## 2025-08-25 DIAGNOSIS — M48.062 LUMBAR STENOSIS WITH NEUROGENIC CLAUDICATION: ICD-10-CM

## 2025-08-25 DIAGNOSIS — M48.062 SPINAL STENOSIS OF LUMBAR REGION WITH NEUROGENIC CLAUDICATION: ICD-10-CM

## 2025-08-25 RX ORDER — OXYCODONE HYDROCHLORIDE 5 MG/1
5-10 TABLET ORAL EVERY 4 HOURS PRN
Qty: 50 TABLET | Refills: 0 | Status: SHIPPED | OUTPATIENT
Start: 2025-08-25

## 2025-08-25 RX ORDER — METHOCARBAMOL 750 MG/1
750 TABLET, FILM COATED ORAL 3 TIMES DAILY
Qty: 70 TABLET | Refills: 2 | Status: SHIPPED | OUTPATIENT
Start: 2025-08-25

## (undated) DEVICE — LINEN TOWEL PACK X5 5464

## (undated) DEVICE — BONE CLEANING TIP INTERPULSE  0210-010-000

## (undated) DEVICE — GLOVE PROTEXIS W/NEU-THERA 8.0  2D73TE80

## (undated) DEVICE — PACK TOTAL KNEE SOP15TKFSD

## (undated) DEVICE — CAST PADDING 6" STERILE 9046S

## (undated) DEVICE — SUTURE VICRYL+ 2-0 CT-2 27" UND VCP269H

## (undated) DEVICE — ESU GROUND PAD UNIVERSAL W/O CORD

## (undated) DEVICE — PREP CHLORAPREP 26ML TINTED HI-LITE ORANGE 930815

## (undated) DEVICE — GOWN IMPERVIOUS SPECIALTY XLG/XLONG 32474

## (undated) DEVICE — SU DERMABOND PRINEO 42CM CLR422US

## (undated) DEVICE — SU MONOCRYL 4-0 PS-2 18" UND Y496G

## (undated) DEVICE — SU VICRYL 1 CTX CR 8X18" J765D

## (undated) DEVICE — WRAP EZY KNEE

## (undated) DEVICE — GLOVE PROTEXIS POWDER FREE 7.5 ORTHOPEDIC 2D73ET75

## (undated) DEVICE — SOL ISOPROPYL RUBBING ALCOHOL USP 70% 4OZ HDX-20 I0020

## (undated) DEVICE — DRSG STERI STRIP 1/2X4" R1547

## (undated) DEVICE — SUCTION IRR SYSTEM W/O TIP INTERPULSE HANDPIECE 0210-100-000

## (undated) DEVICE — Device

## (undated) DEVICE — DRSG ACTICOAT 4X8" 66021771

## (undated) DEVICE — PREP CHLORAPREP 26ML TINTED ORANGE  260815

## (undated) DEVICE — DRAIN RESERVOIR 100ML JP 0070740

## (undated) DEVICE — BLADE KNIFE SURG 11 371111

## (undated) DEVICE — BLADE PRECISION 25X1.27X9 6725127090

## (undated) DEVICE — DRSG GAUZE 2X2" 8042

## (undated) DEVICE — GLOVE PROTEXIS BLUE W/NEU-THERA 8.0  2D73EB80

## (undated) DEVICE — BONE CEMENT MIXEVAC HI VAC W/CARTRIDGE 0306-563-000

## (undated) DEVICE — SUTURE VICRYL+ 2-0 CT-2 CR 8X18" VCP726D

## (undated) DEVICE — DRAIN JACKSON PRATT CHANNEL 10FR RND SIL W/TROCAR JP-2227

## (undated) DEVICE — SOLUTION IRRIGATION 0.9% NACL 1000ML BOTTLE R5200-01

## (undated) DEVICE — DRSG AQUACEL AG 3.5X9.75" HYDROFIBER 412011

## (undated) DEVICE — SOL NACL 0.9% IRRIG 3000ML BAG 2B7477

## (undated) DEVICE — RX SURGIFLO HEMOSTATIC MATRIX W/THROMBIN 8ML 2994

## (undated) DEVICE — DRSG MEPILEX BORDER FLEX 8.7X9.8" 282455

## (undated) DEVICE — SURGICEL POWDER ABSORBABLE HEMOSTAT 3GM 3013SP

## (undated) DEVICE — SOL WATER IRRIG 1000ML BOTTLE 2F7114

## (undated) DEVICE — SOL NACL 0.9% IRRIG 1000ML BOTTLE 2F7124

## (undated) DEVICE — GLOVE PROTEXIS BLUE W/NEU-THERA 7.0  2D73EB70

## (undated) DEVICE — CATH TRAY FOLEY SURESTEP 16FR W/URINE MTR STATLK LF A303416A

## (undated) DEVICE — SUTURE VICRYL+ 1 CT-1 CR 8X18" VIO VCP741D

## (undated) DEVICE — GRAFT DURAGEN 1X1" ID-110: Type: IMPLANTABLE DEVICE | Site: SPINE LUMBAR | Status: NON-FUNCTIONAL

## (undated) DEVICE — CAST PADDING 6" UNSTERILE 9046

## (undated) DEVICE — SU VICRYL 2-0 CT 36" J957H

## (undated) DEVICE — DRAPE STERI U 1015

## (undated) DEVICE — CAST PADDING 4" COTTON WEBRIL UNSTERILE 9084

## (undated) DEVICE — DRSG GAUZE 4X4" 3033

## (undated) DEVICE — SPONGE COTTONOID 1/2X1/2" 20-04S

## (undated) DEVICE — SPONGE SURGIFOAM 100 1974

## (undated) DEVICE — HOOD FLYTE W/PEELAWAY 408-800-100

## (undated) DEVICE — DRSG KERLIX 4 1/2"X4YDS ROLL 6730

## (undated) DEVICE — TUBING SUCTION MEDI-VAC 1/4"X20' N620A

## (undated) DEVICE — LINEN BACK PACK 5440

## (undated) DEVICE — NDL 21GA 1.5"

## (undated) DEVICE — GLOVE PROTEXIS POWDER FREE 7.0 ORTHOPEDIC 2D73ET70

## (undated) DEVICE — SPONGE LAP 04X18" 23250-407

## (undated) DEVICE — SOLUTION WATER 1000ML BOTTLE R5000-01

## (undated) DEVICE — SOLUTION WOUND CLEANSING 3/4OZ 10% PVP EA-L3011FB-50

## (undated) DEVICE — DRAPE STERI TOWEL LG 1010

## (undated) DEVICE — DRSG TEGADERM 4X4 3/4" 1626W

## (undated) DEVICE — TOOL DISSECT MIDAS MR8 14CM MATCH HEAD 3MM MR8-14MH30

## (undated) DEVICE — SPONGE COTTONOID 1X1" 80-1403

## (undated) DEVICE — SOL BENZOIN 0.5OZ

## (undated) DEVICE — DRSG ADAPTIC 3X8" 6113

## (undated) DEVICE — TUBING SMOKE EVAC 3/8"X10' 0702-045-023

## (undated) DEVICE — DRAPE IOBAN INCISE 23X17" 6650EZ

## (undated) DEVICE — SYR 30ML LL W/O NDL 302832

## (undated) DEVICE — MANIFOLD NEPTUNE 4 PORT 700-20

## (undated) DEVICE — SUCTION MANIFOLD NEPTUNE 2 SYS 4 PORT 0702-020-000

## (undated) DEVICE — SUCTION TIP YANKAUER STR K87

## (undated) DEVICE — GLOVE PROTEXIS W/NEU-THERA 7.0  2D73TE70

## (undated) RX ORDER — CEFAZOLIN SODIUM 2 G/100ML
INJECTION, SOLUTION INTRAVENOUS
Status: DISPENSED
Start: 2021-05-10

## (undated) RX ORDER — DIAZEPAM 10 MG/2ML
INJECTION, SOLUTION INTRAMUSCULAR; INTRAVENOUS
Status: DISPENSED
Start: 2025-08-07

## (undated) RX ORDER — VANCOMYCIN HYDROCHLORIDE 1 G/20ML
INJECTION, POWDER, LYOPHILIZED, FOR SOLUTION INTRAVENOUS
Status: DISPENSED
Start: 2025-08-07

## (undated) RX ORDER — FENTANYL CITRATE 50 UG/ML
INJECTION, SOLUTION INTRAMUSCULAR; INTRAVENOUS
Status: DISPENSED
Start: 2019-08-27

## (undated) RX ORDER — CELECOXIB 200 MG/1
CAPSULE ORAL
Status: DISPENSED
Start: 2025-08-07

## (undated) RX ORDER — KETOROLAC TROMETHAMINE 15 MG/ML
INJECTION, SOLUTION INTRAMUSCULAR; INTRAVENOUS
Status: DISPENSED
Start: 2021-05-10

## (undated) RX ORDER — TOBRAMYCIN 1.2 G/30ML
INJECTION, POWDER, LYOPHILIZED, FOR SOLUTION INTRAVENOUS
Status: DISPENSED
Start: 2025-08-07

## (undated) RX ORDER — FENTANYL CITRATE 50 UG/ML
INJECTION, SOLUTION INTRAMUSCULAR; INTRAVENOUS
Status: DISPENSED
Start: 2025-08-07

## (undated) RX ORDER — PROPOFOL 10 MG/ML
INJECTION, EMULSION INTRAVENOUS
Status: DISPENSED
Start: 2021-05-10

## (undated) RX ORDER — HYDROMORPHONE HYDROCHLORIDE 1 MG/ML
INJECTION, SOLUTION INTRAMUSCULAR; INTRAVENOUS; SUBCUTANEOUS
Status: DISPENSED
Start: 2025-08-07

## (undated) RX ORDER — TRANEXAMIC ACID 650 MG/1
TABLET ORAL
Status: DISPENSED
Start: 2021-05-10

## (undated) RX ORDER — SODIUM CHLORIDE, SODIUM LACTATE, POTASSIUM CHLORIDE, CALCIUM CHLORIDE 600; 310; 30; 20 MG/100ML; MG/100ML; MG/100ML; MG/100ML
INJECTION, SOLUTION INTRAVENOUS
Status: DISPENSED
Start: 2025-08-07

## (undated) RX ORDER — FENTANYL CITRATE 50 UG/ML
INJECTION, SOLUTION INTRAMUSCULAR; INTRAVENOUS
Status: DISPENSED
Start: 2021-05-10

## (undated) RX ORDER — ACETAMINOPHEN 325 MG/1
TABLET ORAL
Status: DISPENSED
Start: 2021-05-10

## (undated) RX ORDER — ONDANSETRON 2 MG/ML
INJECTION INTRAMUSCULAR; INTRAVENOUS
Status: DISPENSED
Start: 2021-05-10

## (undated) RX ORDER — HYDROMORPHONE HYDROCHLORIDE 1 MG/ML
INJECTION, SOLUTION INTRAMUSCULAR; INTRAVENOUS; SUBCUTANEOUS
Status: DISPENSED
Start: 2021-05-10

## (undated) RX ORDER — CEFAZOLIN SODIUM/WATER 2 G/20 ML
SYRINGE (ML) INTRAVENOUS
Status: DISPENSED
Start: 2025-08-07

## (undated) RX ORDER — OXYCODONE HYDROCHLORIDE 5 MG/1
TABLET ORAL
Status: DISPENSED
Start: 2025-08-07

## (undated) RX ORDER — FENTANYL CITRATE 0.05 MG/ML
INJECTION, SOLUTION INTRAMUSCULAR; INTRAVENOUS
Status: DISPENSED
Start: 2021-05-10

## (undated) RX ORDER — ACETAMINOPHEN 325 MG/1
TABLET ORAL
Status: DISPENSED
Start: 2025-08-07

## (undated) RX ORDER — LIDOCAINE HYDROCHLORIDE 20 MG/ML
INJECTION, SOLUTION EPIDURAL; INFILTRATION; INTRACAUDAL; PERINEURAL
Status: DISPENSED
Start: 2021-05-10

## (undated) RX ORDER — DEXAMETHASONE SODIUM PHOSPHATE 4 MG/ML
INJECTION, SOLUTION INTRA-ARTICULAR; INTRALESIONAL; INTRAMUSCULAR; INTRAVENOUS; SOFT TISSUE
Status: DISPENSED
Start: 2021-05-10